# Patient Record
Sex: MALE | Race: WHITE | Employment: FULL TIME | URBAN - METROPOLITAN AREA
[De-identification: names, ages, dates, MRNs, and addresses within clinical notes are randomized per-mention and may not be internally consistent; named-entity substitution may affect disease eponyms.]

---

## 2018-07-10 ENCOUNTER — HOSPITAL ENCOUNTER (OUTPATIENT)
Age: 63
Discharge: HOME | End: 2018-07-10
Payer: MEDICAID

## 2018-07-10 ENCOUNTER — OFFICE VISIT (OUTPATIENT)
Dept: NEUROLOGY | Age: 63
End: 2018-07-10
Payer: MEDICAID

## 2018-07-10 VITALS
WEIGHT: 212.6 LBS | HEIGHT: 75 IN | DIASTOLIC BLOOD PRESSURE: 92 MMHG | BODY MASS INDEX: 26.43 KG/M2 | HEART RATE: 68 BPM | SYSTOLIC BLOOD PRESSURE: 135 MMHG
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
## 2018-07-10 DIAGNOSIS — G62.9 NEUROPATHY: Primary | ICD-10-CM

## 2018-07-10 DIAGNOSIS — R26.81 GAIT INSTABILITY: ICD-10-CM

## 2018-07-10 DIAGNOSIS — G62.9 NEUROPATHY: ICD-10-CM

## 2018-07-10 LAB
ESTIMATED AVERAGE GLUCOSE: 117 MG/DL
FOLATE: >20 NG/ML
HBA1C MFR BLD: 5.7 % (ref 4–6)
HOMOCYSTEINE: 12.7 UMOL/L
THYROXINE, FREE: 1.48 NG/DL (ref 0.93–1.7)
TSH SERPL DL<=0.05 MIU/L-ACNC: 5.47 MIU/L (ref 0.3–5)
VITAMIN B-12: 482 PG/ML (ref 232–1245)

## 2018-07-10 PROCEDURE — 82746 ASSAY OF FOLIC ACID SERUM: CPT

## 2018-07-10 PROCEDURE — 82525 ASSAY OF COPPER: CPT

## 2018-07-10 PROCEDURE — 84439 ASSAY OF FREE THYROXINE: CPT

## 2018-07-10 PROCEDURE — G8427 DOCREV CUR MEDS BY ELIG CLIN: HCPCS | Performed by: PSYCHIATRY & NEUROLOGY

## 2018-07-10 PROCEDURE — 82607 VITAMIN B-12: CPT

## 2018-07-10 PROCEDURE — 84156 ASSAY OF PROTEIN URINE: CPT

## 2018-07-10 PROCEDURE — 84207 ASSAY OF VITAMIN B-6: CPT

## 2018-07-10 PROCEDURE — 84446 ASSAY OF VITAMIN E: CPT

## 2018-07-10 PROCEDURE — 84155 ASSAY OF PROTEIN SERUM: CPT

## 2018-07-10 PROCEDURE — 83090 ASSAY OF HOMOCYSTEINE: CPT

## 2018-07-10 PROCEDURE — 99244 OFF/OP CNSLTJ NEW/EST MOD 40: CPT | Performed by: PSYCHIATRY & NEUROLOGY

## 2018-07-10 PROCEDURE — G8419 CALC BMI OUT NRM PARAM NOF/U: HCPCS | Performed by: PSYCHIATRY & NEUROLOGY

## 2018-07-10 PROCEDURE — 3017F COLORECTAL CA SCREEN DOC REV: CPT | Performed by: PSYCHIATRY & NEUROLOGY

## 2018-07-10 PROCEDURE — 36415 COLL VENOUS BLD VENIPUNCTURE: CPT

## 2018-07-10 PROCEDURE — 83921 ORGANIC ACID SINGLE QUANT: CPT

## 2018-07-10 PROCEDURE — 84443 ASSAY THYROID STIM HORMONE: CPT

## 2018-07-10 PROCEDURE — 84166 PROTEIN E-PHORESIS/URINE/CSF: CPT

## 2018-07-10 PROCEDURE — 83036 HEMOGLOBIN GLYCOSYLATED A1C: CPT

## 2018-07-10 PROCEDURE — 84165 PROTEIN E-PHORESIS SERUM: CPT

## 2018-07-10 PROCEDURE — 86235 NUCLEAR ANTIGEN ANTIBODY: CPT

## 2018-07-10 RX ORDER — CARVEDILOL 12.5 MG/1
12.5 TABLET ORAL 2 TIMES DAILY WITH MEALS
COMMUNITY

## 2018-07-10 RX ORDER — LACTULOSE 10 G/15ML
20 SOLUTION ORAL PRN
COMMUNITY
End: 2021-01-05

## 2018-07-10 RX ORDER — QUETIAPINE FUMARATE 50 MG/1
50 TABLET, FILM COATED ORAL 2 TIMES DAILY
COMMUNITY
End: 2019-03-19 | Stop reason: ALTCHOICE

## 2018-07-10 RX ORDER — BUPROPION HYDROCHLORIDE 100 MG/1
100 TABLET ORAL 2 TIMES DAILY
COMMUNITY
End: 2020-07-28 | Stop reason: ALTCHOICE

## 2018-07-10 RX ORDER — HYDROCODONE BITARTRATE AND ACETAMINOPHEN 5; 325 MG/1; MG/1
1 TABLET ORAL EVERY 6 HOURS PRN
COMMUNITY
End: 2020-07-28

## 2018-07-10 RX ORDER — ONDANSETRON 4 MG/1
4 TABLET, FILM COATED ORAL EVERY 8 HOURS PRN
COMMUNITY
End: 2020-07-28

## 2018-07-10 RX ORDER — ESCITALOPRAM OXALATE 20 MG/1
20 TABLET ORAL DAILY
COMMUNITY

## 2018-07-10 RX ORDER — ALLOPURINOL 100 MG/1
100 TABLET ORAL 2 TIMES DAILY
COMMUNITY

## 2018-07-10 RX ORDER — LISINOPRIL 10 MG/1
10 TABLET ORAL DAILY
COMMUNITY

## 2018-07-10 NOTE — PROGRESS NOTES
[facility]  [address]  [facility], [address] [facility]  Dept: [phone]  Dept Fax: [phone]       MD Angela Renteria MD Ahmed B. Lolita Clap, MD Paola Seaman, MD Colby Shell, MD Orpah Comp, NIRALI    New Patient Consultation    7/10/2018    History of Present Illness:  I had the pleasure of seeing your patient, Crystal Ortega who presents with frequent falls. Patient reports his symptoms started approximately a year ago. He feels out of balance, whenever he walks and even sometimes if he is just standing on solid ground. He has fallen in all directions: Forward, backward and sideways. He denies any associated dizziness or lightheadedness, denies any palpitations, blurring of vision, diplopia, headache or any focal neurologic deficits. He denies any weakness in his lower extremities or pain. He does report a 6 month history of numbness that started in his toes, and this gradually moved up to both ankles. Patient says he cannot feel his shoes, and does not feel his dog licking his feet. He denies any associated tingling or pain. He reports falling approximately 3 times a week and has scraped himself multiple times, denies any serious injury. He lives with his wife in a 1 level house, but has multiple steps inside and outside the house. He also reports a history of brachial plexopathy on the right from an accident back in 2006, and has some residual weakness with his right . He has a history of hepatitis C and prior encephalopathy from this, has no history of diabetes.     Prior workup: none      Past Medical History:   Diagnosis Date    Hepatitis C     Hypertension     Neuropathy ([facility] [address]        Past Surgical History:   Procedure Laterality Date    TONSILLECTOMY         Family History   Problem Relation Age of Onset    Migraines Mother     Alzheimer's Disease Father     Alzheimer's Disease Paternal Grandfather        Social History ANDREW, no nystagmus, no ptosis   V - normal facial sensation                                                               VII - normal facial symmetry                                                             VIII - intact hearing                                                                             IX, X - symmetrical palate                                                                  XI - symmetrical shoulder shrug                                                       XII - tongue midline without atrophy or fasciculation      Motor function  Normal muscle bulk and tone; strength 5/5 on all b/l upper extremities, no pronator drift. Strength 5/5 on b/l quads, hamstrings, knee flexion and extension, 4-/5 on foot dorsiflexion on L, 4+ on R. Foot eversion 4-/5 on L, 4+/5 on R, plantarflexion 5/5 on both feet       Sensory function Dense sensory loss to or for modalities on the bilateral feet up to the ankles      Cerebellar Intact fine motor movement. No involuntary movements or tremors. No ataxia or dysmetria on finger to nose or heel to shin testing      Reflex function DTR 2+ on bilateral UE and LE, symmetric. Negative Babinski      Gait                   normal base, + steppage gait          Assessment:  Bilateral foot drop  Frequent falls  Neuropathy    Plan: This is a 66 y.o. male who presents with a one-year history of falls in a 6 month history of numbness in both feet. Exam today is remarkable for weakness in plantarflexion and eversion on both feet, worse on the left. He also has dense sensory loss to all 4 modalities on both feet up to the ankles. Will start workup with an EMG of the bilateral lower extremities as well as lab work to look for secondary causes of neuropathy. Given his frequent falls and gait instability, we'll also start gait training through physical therapy. Follow-up in 8-10 weeks.             Signed: Cristian Antonio MD    Please note that this chart was generated using

## 2018-07-11 LAB
ALBUMIN (CALCULATED): 4.9 G/DL (ref 3.2–5.2)
ALBUMIN PERCENT: 65 % (ref 45–65)
ALPHA 1 PERCENT: 2 % (ref 3–6)
ALPHA 2 PERCENT: 10 % (ref 6–13)
ALPHA-1-GLOBULIN: 0.1 G/DL (ref 0.1–0.4)
ALPHA-2-GLOBULIN: 0.7 G/DL (ref 0.5–0.9)
ANTI SSA: 27 U/ML
ANTI SSB: 18 U/ML
BETA GLOBULIN: 0.7 G/DL (ref 0.5–1.1)
BETA PERCENT: 9 % (ref 11–19)
GAMMA GLOBULIN %: 15 % (ref 9–20)
GAMMA GLOBULIN: 1.1 G/DL (ref 0.5–1.5)
PATHOLOGIST: ABNORMAL
PROTEIN ELECTROPHORESIS, SERUM: ABNORMAL
TOTAL PROT. SUM,%: 101 % (ref 98–102)
TOTAL PROT. SUM: 7.5 G/DL (ref 6.3–8.2)
TOTAL PROTEIN: 7.5 G/DL (ref 6.4–8.3)

## 2018-07-12 LAB
P E INTERPRETATION, U: NORMAL
PATHOLOGIST: NORMAL
SPECIMEN TYPE: NORMAL
URINE TOTAL PROTEIN: 10 MG/DL

## 2018-07-13 LAB
COPPER: 132 UG/DL (ref 70–140)
METHYLMALONIC ACID: 0.21 UMOL/L (ref 0–0.4)

## 2018-07-14 LAB — VITAMIN B6: 83.8 NMOL/L (ref 20–125)

## 2018-07-15 LAB
ALPHA-TOCOPHEROL: 10.7 MG/L (ref 5.5–18)
GAMMA-TOCOPHEROL: 0.6 MG/L (ref 0–6)

## 2018-07-16 ENCOUNTER — TELEPHONE (OUTPATIENT)
Dept: NEUROLOGY | Age: 63
End: 2018-07-16

## 2018-07-25 ENCOUNTER — TELEPHONE (OUTPATIENT)
Dept: NEUROLOGY | Age: 63
End: 2018-07-25

## 2018-07-25 DIAGNOSIS — R26.81 GAIT INSTABILITY: Primary | ICD-10-CM
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
## 2018-07-25 NOTE — TELEPHONE ENCOUNTER
Pt called in stating he had gone to therapy and the therapist stated he needs a walker. He is asking for an order for a walker. Please advise, thanks.

## 2018-07-27 ENCOUNTER — TELEPHONE (OUTPATIENT)
Dept: NEUROLOGY | Age: 63
End: 2018-07-27

## 2018-08-21 ENCOUNTER — OFFICE VISIT (OUTPATIENT)
Dept: NEUROLOGY | Age: 63
End: 2018-08-21
Payer: MEDICAID

## 2018-08-21 VITALS — HEIGHT: 74 IN | BODY MASS INDEX: 27.21 KG/M2 | WEIGHT: 212 LBS

## 2018-08-21 DIAGNOSIS — R29.6 RECURRENT FALLS WHILE WALKING: ICD-10-CM

## 2018-08-21 DIAGNOSIS — R20.0 NUMBNESS IN BOTH LEGS: Primary | ICD-10-CM

## 2018-08-21 DIAGNOSIS — R26.81 GAIT INSTABILITY: ICD-10-CM

## 2018-08-21 PROCEDURE — 95886 MUSC TEST DONE W/N TEST COMP: CPT | Performed by: PSYCHIATRY & NEUROLOGY

## 2018-08-21 PROCEDURE — 95912 NRV CNDJ TEST 11-12 STUDIES: CPT | Performed by: PSYCHIATRY & NEUROLOGY

## 2018-09-18 ENCOUNTER — OFFICE VISIT (OUTPATIENT)
Dept: NEUROLOGY | Age: 63
End: 2018-09-18
Payer: MEDICAID

## 2018-09-18 VITALS
WEIGHT: 216.6 LBS | HEIGHT: 73 IN | SYSTOLIC BLOOD PRESSURE: 126 MMHG | DIASTOLIC BLOOD PRESSURE: 87 MMHG | BODY MASS INDEX: 28.71 KG/M2 | HEART RATE: 70 BPM

## 2018-09-18 DIAGNOSIS — R73.03 PREDIABETES: ICD-10-CM

## 2018-09-18 DIAGNOSIS — G60.8 PERIPHERAL SENSORY-MOTOR AXONAL POLYNEUROPATHY: Primary | ICD-10-CM

## 2018-09-18 DIAGNOSIS — E03.8 SUBCLINICAL HYPOTHYROIDISM: ICD-10-CM

## 2018-09-18 PROCEDURE — G8419 CALC BMI OUT NRM PARAM NOF/U: HCPCS | Performed by: PSYCHIATRY & NEUROLOGY

## 2018-09-18 PROCEDURE — 3017F COLORECTAL CA SCREEN DOC REV: CPT | Performed by: PSYCHIATRY & NEUROLOGY

## 2018-09-18 PROCEDURE — 1036F TOBACCO NON-USER: CPT | Performed by: PSYCHIATRY & NEUROLOGY

## 2018-09-18 PROCEDURE — 99214 OFFICE O/P EST MOD 30 MIN: CPT | Performed by: PSYCHIATRY & NEUROLOGY

## 2018-09-18 PROCEDURE — G8427 DOCREV CUR MEDS BY ELIG CLIN: HCPCS | Performed by: PSYCHIATRY & NEUROLOGY

## 2018-09-18 NOTE — PROGRESS NOTES
(SEROQUEL) 50 MG tablet Take 50 mg by mouth 2 times daily      buPROPion (WELLBUTRIN) 100 MG tablet Take 100 mg by mouth 2 times daily      carvedilol (COREG) 12.5 MG tablet Take 12.5 mg by mouth 2 times daily (with meals)      allopurinol (ZYLOPRIM) 100 MG tablet Take 100 mg by mouth daily      lisinopril (PRINIVIL;ZESTRIL) 10 MG tablet Take 10 mg by mouth daily      ondansetron (ZOFRAN) 4 MG tablet Take 4 mg by mouth every 8 hours as needed for Nausea or Vomiting      lactulose (CHRONULAC) 10 GM/15ML solution Take 20 g by mouth as needed      HYDROcodone-acetaminophen (NORCO) 5-325 MG per tablet Take 1 tablet by mouth every 6 hours as needed for Pain. .       No current facility-administered medications for this visit.                                          PHYSICAL EXAMINATION       Vitals:    09/18/18 0904   BP: 126/87   Pulse: 70                                              .                                                                                                    General Appearance:  Alert, cooperative, no signs of distress, appears stated age   Head:  Normocephalic, no signs of trauma   Eyes:  Conjunctiva/corneas clear;  eyelids intact   Ears:  Normal external ear and canals   Nose: Nares normal, mucosa normal, no drainage    Throat: Lips and tongue normal; teeth normal;  gums normal   Neck: Supple, intact flexion, extension and rotation;   trachea midline;  no adenopathy;   thyroid: not enlarged;   no carotid pulse abnormality   Back:   Symmetric, no curvature, ROM adequate   Lungs:   Respirations unlabored   Heart:  Regular rate and rhythm           Extremities: Extremities normal, no cyanosis, no edema   Pulses: Symmetric over head and neck   Skin: Skin color, texture normal, no rashes, no lesions                                             NEUROLOGIC EXAMINATION    Mental status    Alert and oriented x 3; intact memory with no confusion, speech or language problems; no hallucinations or delusions  Fund of information appropriate for level of education    Cranial nerves    II - visual fields intact to confrontation , fundoscopy showed no  papiledema                                                III, IV, VI - extra-ocular muscles full: no pupillary defect; no ANDREW, no nystagmus, no ptosis   V - normal facial sensation                                                               VII - normal facial symmetry                                                             VIII - intact hearing                                                                             IX, X - symmetrical palate                                                                  XI - symmetrical shoulder shrug                                                       XII - tongue midline without atrophy or fasciculation      Motor function  Normal muscle bulk and tone; strength 5/5 on all b/l upper extremities, no pronator drift. Strength 5/5 on b/l quads, hamstrings, knee flexion and extension, 4-/5 on foot dorsiflexion on L, 4+ on R. Foot eversion 4-/5 on L, 4+/5 on R, plantarflexion 5/5 on both feet       Sensory function Dense sensory loss to or for modalities on the bilateral feet up to the ankles      Cerebellar Intact fine motor movement. No involuntary movements or tremors. No ataxia or dysmetria on finger to nose or heel to shin testing      Reflex function DTR 2+ on bilateral UE and LE, symmetric. Negative Babinski      Gait                   normal base, + steppage gait              ASSESSMENT/PLAN:       This is a 66 y.o. male who comes in for follow up for A history of frequent falls and numbness in the bilateral feet. Workup showed severe axonal sensorimotor polyneuropathy. Lab work showed both prediabetes as well as subclinical hypothyroidism. I suspect it is mostly the prediabetes that is causing his neuropathy.  Discussed dietary changes in detail with the patient, particularly decreasing carbohydrate intake to

## 2019-03-19 ENCOUNTER — OFFICE VISIT (OUTPATIENT)
Dept: NEUROLOGY | Age: 64
End: 2019-03-19
Payer: MEDICARE

## 2019-03-19 VITALS
DIASTOLIC BLOOD PRESSURE: 86 MMHG | WEIGHT: 224 LBS | HEIGHT: 75 IN | BODY MASS INDEX: 27.85 KG/M2 | HEART RATE: 78 BPM | SYSTOLIC BLOOD PRESSURE: 124 MMHG

## 2019-03-19 DIAGNOSIS — G60.8 PERIPHERAL SENSORY-MOTOR AXONAL POLYNEUROPATHY: Primary | ICD-10-CM

## 2019-03-19 DIAGNOSIS — R29.6 RECURRENT FALLS WHILE WALKING: ICD-10-CM

## 2019-03-19 DIAGNOSIS — R26.81 GAIT INSTABILITY: ICD-10-CM

## 2019-03-19 PROCEDURE — G8427 DOCREV CUR MEDS BY ELIG CLIN: HCPCS | Performed by: PSYCHIATRY & NEUROLOGY

## 2019-03-19 PROCEDURE — G8484 FLU IMMUNIZE NO ADMIN: HCPCS | Performed by: PSYCHIATRY & NEUROLOGY

## 2019-03-19 PROCEDURE — 3017F COLORECTAL CA SCREEN DOC REV: CPT | Performed by: PSYCHIATRY & NEUROLOGY

## 2019-03-19 PROCEDURE — G8419 CALC BMI OUT NRM PARAM NOF/U: HCPCS | Performed by: PSYCHIATRY & NEUROLOGY

## 2019-03-19 PROCEDURE — 1036F TOBACCO NON-USER: CPT | Performed by: PSYCHIATRY & NEUROLOGY

## 2019-03-19 PROCEDURE — 99214 OFFICE O/P EST MOD 30 MIN: CPT | Performed by: PSYCHIATRY & NEUROLOGY

## 2019-03-19 RX ORDER — RISPERIDONE 0.5 MG/1
0.5 TABLET, FILM COATED ORAL 2 TIMES DAILY
COMMUNITY
End: 2021-01-05

## 2019-03-21 DIAGNOSIS — G60.8 PERIPHERAL SENSORY-MOTOR AXONAL POLYNEUROPATHY: ICD-10-CM

## 2019-07-09 ENCOUNTER — HOSPITAL ENCOUNTER (OUTPATIENT)
Age: 64
Setting detail: SPECIMEN
Discharge: HOME | End: 2019-07-09
Payer: MEDICARE

## 2019-07-09 ENCOUNTER — OFFICE VISIT (OUTPATIENT)
Dept: NEUROLOGY | Age: 64
End: 2019-07-09
Payer: MEDICARE

## 2019-07-09 VITALS
HEART RATE: 93 BPM | BODY MASS INDEX: 29.02 KG/M2 | HEIGHT: 75 IN | WEIGHT: 233.4 LBS | SYSTOLIC BLOOD PRESSURE: 136 MMHG | DIASTOLIC BLOOD PRESSURE: 94 MMHG

## 2019-07-09 DIAGNOSIS — R73.03 PREDIABETES: ICD-10-CM

## 2019-07-09 DIAGNOSIS — R26.81 GAIT INSTABILITY: ICD-10-CM

## 2019-07-09 DIAGNOSIS — G60.8 PERIPHERAL SENSORY-MOTOR AXONAL POLYNEUROPATHY: Primary | ICD-10-CM

## 2019-07-09 PROCEDURE — G8419 CALC BMI OUT NRM PARAM NOF/U: HCPCS | Performed by: PSYCHIATRY & NEUROLOGY

## 2019-07-09 PROCEDURE — 99214 OFFICE O/P EST MOD 30 MIN: CPT | Performed by: PSYCHIATRY & NEUROLOGY

## 2019-07-09 PROCEDURE — 1036F TOBACCO NON-USER: CPT | Performed by: PSYCHIATRY & NEUROLOGY

## 2019-07-09 PROCEDURE — G8427 DOCREV CUR MEDS BY ELIG CLIN: HCPCS | Performed by: PSYCHIATRY & NEUROLOGY

## 2019-07-09 PROCEDURE — 3017F COLORECTAL CA SCREEN DOC REV: CPT | Performed by: PSYCHIATRY & NEUROLOGY

## 2019-07-09 RX ORDER — TRAZODONE HYDROCHLORIDE 150 MG/1
150 TABLET ORAL NIGHTLY
COMMUNITY

## 2019-07-09 RX ORDER — ARIPIPRAZOLE 5 MG/1
5 TABLET ORAL DAILY
COMMUNITY

## 2019-07-09 SDOH — HEALTH STABILITY: MENTAL HEALTH: HOW OFTEN DO YOU HAVE A DRINK CONTAINING ALCOHOL?: NOT ASKED

## 2019-07-09 NOTE — PROGRESS NOTES
symmetry                                                             VIII - intact hearing                                                                             IX, X - symmetrical palate                                                                  XI - symmetrical shoulder shrug                                                       XII - tongue midline without atrophy or fasciculation      Motor function  Normal muscle bulk and tone; strength 5/5 on all b/l upper extremities, no pronator drift. Strength 5/5 on b/l quads, hamstrings, knee flexion and extension, 4-/5 on foot dorsiflexion on L, 4+ on R. Foot eversion 4-/5 on L, 4+/5 on R, plantarflexion 5/5 on both feet       Sensory function Dense sensory loss to all 4 modalities on the bilateral feet up to the ankles      Cerebellar Intact fine motor movement. No involuntary movements or tremors. No ataxia or dysmetria on finger to nose or heel to shin testing      Reflex function DTR 2+ on bilateral UE and LE, symmetric. Negative Babinski      Gait                   normal base, + foot eversion on R          ASSESSMENT AND PLAN:       This is a 60 y.o. male who comes in for follow up for severe axonal sensorimotor polyneuropathy causing frequent falls and numbness in both feet. Repeat TSH normal, A1c increasing to 6.0. Patient reports he has decreased sugar intake since then, will recheck A1c today. We will also reorder physical therapy at [facility] to help with his balance problems since he continues to have regular falls. Follow-up in 3 to 6 months. Yemi Warner MD  Neurology and Sleep Medicine  [facility] 22.    Please note that this chart was generated using voice recognition Dragon dictation software. Although every effort was made to ensure the accuracy of this automated transcription, some errors in transcription may have occurred.

## 2019-07-10 LAB
ESTIMATED AVERAGE GLUCOSE: 140 MG/DL
HBA1C MFR BLD: 6.5 % (ref 4–6)

## 2020-01-09 ENCOUNTER — HOSPITAL ENCOUNTER (OUTPATIENT)
Age: 65
Setting detail: SPECIMEN
Discharge: HOME | End: 2020-01-09
Payer: MEDICARE

## 2020-01-09 ENCOUNTER — OFFICE VISIT (OUTPATIENT)
Dept: NEUROLOGY | Age: 65
End: 2020-01-09
Payer: MEDICARE

## 2020-01-09 VITALS
HEIGHT: 76 IN | SYSTOLIC BLOOD PRESSURE: 109 MMHG | HEART RATE: 64 BPM | DIASTOLIC BLOOD PRESSURE: 75 MMHG | WEIGHT: 229.8 LBS | BODY MASS INDEX: 27.98 KG/M2

## 2020-01-09 LAB
ESTIMATED AVERAGE GLUCOSE: 143 MG/DL
HBA1C MFR BLD: 6.6 % (ref 4–6)

## 2020-01-09 PROCEDURE — G8427 DOCREV CUR MEDS BY ELIG CLIN: HCPCS | Performed by: PSYCHIATRY & NEUROLOGY

## 2020-01-09 PROCEDURE — 1036F TOBACCO NON-USER: CPT | Performed by: PSYCHIATRY & NEUROLOGY

## 2020-01-09 PROCEDURE — 99214 OFFICE O/P EST MOD 30 MIN: CPT | Performed by: PSYCHIATRY & NEUROLOGY

## 2020-01-09 PROCEDURE — G8419 CALC BMI OUT NRM PARAM NOF/U: HCPCS | Performed by: PSYCHIATRY & NEUROLOGY

## 2020-01-09 PROCEDURE — G8484 FLU IMMUNIZE NO ADMIN: HCPCS | Performed by: PSYCHIATRY & NEUROLOGY

## 2020-01-09 PROCEDURE — 3017F COLORECTAL CA SCREEN DOC REV: CPT | Performed by: PSYCHIATRY & NEUROLOGY

## 2020-01-09 RX ORDER — CHLORAL HYDRATE 500 MG
1 CAPSULE ORAL DAILY PRN
COMMUNITY

## 2020-01-09 NOTE — PROGRESS NOTES
[facility]  Offices: [address], [place], [address] [facility]  [address] [facility], [address], [address], [address] [place], [address]  Phone: [phone]  Fax: [phone]    MD Tamara Shetty Sa, MD Ashley Ramos, MD Darby Barksdale, MD [place], CNP    1/9/2020      HISTORY OF PRESENT ILLNESS:       I had the pleasure of seeing Shayy Ko, who returns for continuing neurologic care for numbness affecting both feet (onset 1/2018) and frequent falls (onset July 2017) secondary to severe axonal sensorimotor peripheral polyneuropathy. On his last visit, we started physical therapy at [facility] and also recheck his A1c. This is unfortunately increased to 6.5. Patient reports he was told by his general practitioner that he does not need medications yet, until it is rechecked. However, patient reports he has not had any blood draws since the summer. Clinically, he does report improvement of his balance with physical therapy. He denies any recent falls or near falls. His numbness also continues to be improved. It initially involved both his feet up to the midcalf, and is now limited to the midfoot down. The numbness comes and goes with no clear triggers, no alleviating or aggravating factors. He denies any significant associated pain, no burning or tingling. He denies any changes in his bowel or bladder habits. Of note, patient reports he does not eat a lot of sweets but the lactulose that he drinks for his history of hepatic encephalopathy is very sweet. He reportedly sees a GI nurse practitioner, does not know if his ammonia has been checked recently. He denies any other new complaints, no new medications.     Prior testing reviewed:  EMG 8/21/18: Above mentioned findings are indicating a relatively active and progressive process with combination of denervation and re-innervated motor unit action potentials (MUAPs) with decreased recruitment seen predominantly in distal muscle groups of both lower extremities suggesting axonal sensorimotor peripheral polyneuropathy of severe nature.  Clinical correlation is recommended. This study also demonstrated findings raising concern for bilateral lower lumbosacral radiculopathy of chronic nature.  Obtaining neuroimaging studies of spine would be helpful.  Clinical correlation is recommended.     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[address]     Depression     Fall down stairs 2019    Gout     Hepatic encephalopathy (HCC)     Hepatitis C     Hypertension     Neuropathy         PAST SURGICAL HISTORY:         Procedure Laterality Date    COLONOSCOPY  2013    ENDOSCOPIC ULTRASOUND (UPPER)  2019    TONSILLECTOMY          SOCIAL HISTORY:     Social History     Socioeconomic History    Marital status: Single     Spouse name: Not on file    Number of children: Not on file    Years of education: Not on file    Highest education level: Not on file   Occupational History    Not on file   Social Needs    Financial resource strain: Not on file    Food insecurity:     Worry: Not on file     Inability: Not on file    Transportation needs:     Medical: Not on file     Non-medical: Not on file   Tobacco Use    Smoking status: Former Smoker     Packs/day: 0.25     Years: 5.00     Pack years: 1.25     Last attempt to quit: 1975     Years since quittin.5    Smokeless tobacco: Never Used   Substance and Sexual Activity    Alcohol use: Not Currently    Drug use: No    Sexual activity: Not on file   Lifestyle    Physical activity:     Days per week: Not on file     Minutes per session: Not on file    Stress: Not on file Relationships    Social connections:     Talks on phone: Not on file     Gets together: Not on file     Attends Mosque service: Not on file     Active member of club or organization: Not on file     Attends meetings of clubs or organizations: Not on file     Relationship status: Not on file    Intimate partner violence:     Fear of current or ex partner: Not on file     Emotionally abused: Not on file     Physically abused: Not on file     Forced sexual activity: Not on file   Other Topics Concern    Not on file   Social History Narrative    Not on file       CURRENT MEDICATIONS:     Current Outpatient Medications   Medication Sig Dispense Refill    Multiple Vitamins-Minerals (MULTIVITAL-M PO) Take 1 tablet by mouth daily as needed      Omega-3 1000 MG CAPS Take 1 capsule by mouth daily as needed      traZODone (DESYREL) 100 MG tablet Take 100 mg by mouth nightly      ARIPiprazole (ABILIFY) 5 MG tablet Take 5 mg by mouth daily      risperiDONE (RISPERDAL) 0.5 MG tablet Take 0.5 mg by mouth 2 times daily      escitalopram (LEXAPRO) 20 MG tablet Take 20 mg by mouth daily      buPROPion (WELLBUTRIN) 100 MG tablet Take 100 mg by mouth 2 times daily      carvedilol (COREG) 12.5 MG tablet Take 12.5 mg by mouth 2 times daily (with meals)      allopurinol (ZYLOPRIM) 100 MG tablet Take 100 mg by mouth 2 times daily       lisinopril (PRINIVIL;ZESTRIL) 10 MG tablet Take 10 mg by mouth daily      ondansetron (ZOFRAN) 4 MG tablet Take 4 mg by mouth every 8 hours as needed for Nausea or Vomiting      lactulose (CHRONULAC) 10 GM/15ML solution Take 20 g by mouth as needed      HYDROcodone-acetaminophen (NORCO) 5-325 MG per tablet Take 1 tablet by mouth every 6 hours as needed for Pain. .       No current facility-administered medications for this visit.          ALLERGIES:     Allergies   Allergen Reactions    Haloperidol And Related                              REVIEW OF SYSTEMS    CONSTITUTIONAL Weight: head and neck   Skin: Skin color, texture normal, no rashes, no lesions                                     NEUROLOGIC EXAMINATION    Alert and oriented x 3; intact memory with no confusion, speech or language problems; no hallucinations or delusions  Fund of information appropriate for level of education    Cranial nerves    II - visual fields intact to confrontation , fundoscopy showed no  papiledema                                                III, IV, VI - extra-ocular muscles full: no pupillary defect; no ANDREW, no nystagmus, no ptosis   V - normal facial sensation                                                               VII - normal facial symmetry                                                             VIII - intact hearing                                                                             IX, X - symmetrical palate                                                                  XI - symmetrical shoulder shrug                                                       XII - tongue midline without atrophy or fasciculation      Motor function  Normal muscle bulk and tone; strength 5/5 on b/l upper extremities, no pronator drift. Strength 5/5 on b/l quads, hamstrings, knee flexion and extension, 4-/5 on foot dorsiflexion on L, 4+ on R. Foot eversion 4-/5 on L, 4+/5 on R, plantarflexion 5/5 on both feet       Sensory function Dense sensory loss to all 4 modalities on the bilateral feet up to the ankles      Cerebellar Intact fine motor movement. No involuntary movements or tremors. No ataxia or dysmetria on finger to nose or heel to shin testing      Reflex function DTR 1+ on bilateral UE and LE, symmetric. Negative Babinski      Gait                   normal base, + foot eversion on R                 ASSESSMENT AND PLAN:       This is a 60 y.o. male who comes in for follow up for severe axonal sensorimotor polyneuropathy causing frequent falls and numbness in both feet.   His A1c has gradually been increasing and is now in the diabetic range at 6.5. He is not on any treatment. Will recheck his A1c again today, as well as his ammonia since he drinks lactulose on a regular basis for reported hepatic encephalopathy. If his A1c is again elevated, he will need to be started on treatment. Will call patient with results, follow-up in clinic in 6 months. Fredi Gimenez MD  Neurology and Sleep Medicine  [facility] 22.    Please note that this chart was generated using voice recognition Dragon dictation software. Although every effort was made to ensure the accuracy of this automated transcription, some errors in transcription may have occurred.

## 2020-07-28 ENCOUNTER — OFFICE VISIT (OUTPATIENT)
Dept: NEUROLOGY | Age: 65
End: 2020-07-28
Payer: MEDICARE

## 2020-07-28 ENCOUNTER — HOSPITAL ENCOUNTER (OUTPATIENT)
Age: 65
Discharge: HOME | End: 2020-07-28
Payer: MEDICARE

## 2020-07-28 VITALS
SYSTOLIC BLOOD PRESSURE: 90 MMHG | WEIGHT: 227.2 LBS | HEART RATE: 85 BPM | BODY MASS INDEX: 28.25 KG/M2 | DIASTOLIC BLOOD PRESSURE: 63 MMHG | TEMPERATURE: 97.3 F | HEIGHT: 75 IN

## 2020-07-28 LAB
ESTIMATED AVERAGE GLUCOSE: 137 MG/DL
HBA1C MFR BLD: 6.4 % (ref 4–6)
TSH SERPL DL<=0.05 MIU/L-ACNC: 3.34 MIU/L (ref 0.3–5)

## 2020-07-28 PROCEDURE — 84443 ASSAY THYROID STIM HORMONE: CPT

## 2020-07-28 PROCEDURE — 36415 COLL VENOUS BLD VENIPUNCTURE: CPT

## 2020-07-28 PROCEDURE — 1036F TOBACCO NON-USER: CPT | Performed by: PSYCHIATRY & NEUROLOGY

## 2020-07-28 PROCEDURE — 83036 HEMOGLOBIN GLYCOSYLATED A1C: CPT

## 2020-07-28 PROCEDURE — G8419 CALC BMI OUT NRM PARAM NOF/U: HCPCS | Performed by: PSYCHIATRY & NEUROLOGY

## 2020-07-28 PROCEDURE — G8427 DOCREV CUR MEDS BY ELIG CLIN: HCPCS | Performed by: PSYCHIATRY & NEUROLOGY

## 2020-07-28 PROCEDURE — 3017F COLORECTAL CA SCREEN DOC REV: CPT | Performed by: PSYCHIATRY & NEUROLOGY

## 2020-07-28 PROCEDURE — 99214 OFFICE O/P EST MOD 30 MIN: CPT | Performed by: PSYCHIATRY & NEUROLOGY

## 2020-07-28 RX ORDER — METFORMIN HYDROCHLORIDE 500 MG/1
1 TABLET, EXTENDED RELEASE ORAL 2 TIMES DAILY
COMMUNITY
Start: 2020-07-06

## 2020-07-28 RX ORDER — RIFAXIMIN 550 MG/1
1 TABLET ORAL 2 TIMES DAILY
COMMUNITY
Start: 2020-07-15

## 2020-07-28 RX ORDER — FENOFIBRATE 145 MG/1
1 TABLET, COATED ORAL DAILY
COMMUNITY
Start: 2020-07-06

## 2020-07-28 NOTE — PROGRESS NOTES
[facility]  Offices: [address], [place], [address] [facility]  [address] [facility], [address], [place], 183 [facility]  [address] [place] [address] Saji Thomas, [address]  Phone: [phone]  Fax: [phone]    MD Star Nolan, MD Macario Borges MD Delories Elk, MD Youlanda Francois, CNP    7/28/2020      HISTORY OF PRESENT ILLNESS:       I had the pleasure of seeing Masha Acevedo, who returns for continuing neurologic care for numbness affecting both feet (onset 1/2018) and frequent falls (onset July 2017) secondary to severe axonal sensorimotor peripheral polyneuropathy. On his last visit, I rechecked his A1c and ammonia given his history of hepatic encephalopathy. His ammonia came back normal but his A1c has increased to 6.5. Patient reports he is now on metformin  mg daily that was started approximately 3 months ago. He has also stopped taking lactulose since his ammonia has been within normal limits. Thankfully, patient denies any recent falls and no significant change with his bilateral lower extremity numbness. He continues to deny any burning, tingling or discomfort. His numbness is now constant, with no clear aggravating or alleviating factors. He denies any changes in bowel or bladder habits, no new symptoms. Of note, review of his prior labs also showed an elevated TSH back in 2018. Patient denies any history of thyroid issues. Prior testing reviewed:  EMG 8/21/18: Above mentioned findings are indicating a relatively active and progressive process with combination of denervation and re-innervated motor unit action potentials (MUAPs) with decreased recruitment seen predominantly in distal muscle groups of both lower extremities suggesting axonal sensorimotor peripheral polyneuropathy of severe nature.  Clinical correlation is recommended.   This study also demonstrated findings raising concern for bilateral service: Not on file     Active member of club or organization: Not on file     Attends meetings of clubs or organizations: Not on file     Relationship status: Not on file    Intimate partner violence     Fear of current or ex partner: Not on file     Emotionally abused: Not on file     Physically abused: Not on file     Forced sexual activity: Not on file   Other Topics Concern    Not on file   Social History Narrative    Not on file       CURRENT MEDICATIONS:     Current Outpatient Medications   Medication Sig Dispense Refill    fenofibrate (TRICOR) 145 MG tablet Take 1 tablet by mouth daily      XIFAXAN 550 MG tablet Take 1 tablet by mouth 2 times daily      metFORMIN (GLUCOPHAGE-XR) 500 MG extended release tablet Take 1 tablet by mouth daily      Multiple Vitamins-Minerals (MULTIVITAL-M PO) Take 1 tablet by mouth daily as needed      Omega-3 1000 MG CAPS Take 1 capsule by mouth daily as needed      traZODone (DESYREL) 100 MG tablet Take 100 mg by mouth nightly      ARIPiprazole (ABILIFY) 5 MG tablet Take 5 mg by mouth daily      risperiDONE (RISPERDAL) 0.5 MG tablet Take 0.5 mg by mouth 2 times daily      escitalopram (LEXAPRO) 20 MG tablet Take 20 mg by mouth daily      carvedilol (COREG) 12.5 MG tablet Take 12.5 mg by mouth 2 times daily (with meals)      allopurinol (ZYLOPRIM) 100 MG tablet Take 100 mg by mouth 2 times daily       lisinopril (PRINIVIL;ZESTRIL) 10 MG tablet Take 10 mg by mouth daily      lactulose (CHRONULAC) 10 GM/15ML solution Take 20 g by mouth as needed       No current facility-administered medications for this visit.          ALLERGIES:     Allergies   Allergen Reactions    Haloperidol And Related                              REVIEW OF SYSTEMS    CONSTITUTIONAL Weight: present, Appetite: absent, Fatigue: present      HEENT Ears: ringing, Visual disturbance: absent   RESPIRATORY Shortness of breath: absent, Cough: absent   CARDIOVASCULAR Chest pain: absent, Leg swelling :absent      GI Constipation: absent, Diarrhea: absent, Swallowing change: absent       Urinary frequency: absent, Urinary urgency: absent, Urinary incontinence: absent   MUSCULOSKELETAL Neck pain: absent, Back pain: absent, Stiffness: absent, Muscle pain: absent, Joint pain: absent Restless legs: absent   DERMATOLOGIC Hair loss: absent, Skin changes: absent   NEUROLOGIC Memory loss: absent, Confusion: absent, Seizures: absent Trouble walking or imbalance: absent, Dizziness: absent, Weakness: absent, Numbness: present Tremor: absent, Spasm: absent, Speech difficulty: absent, Headache: absent, Light sensitivity: absent   PSYCHIATRIC Anxiety: absent, Hallucination: absent, Mood disorder: absent   HEMATOLOGIC Abnormal bleeding: absent, Anemia: absent, Clotting disorder: absent, Lymph gland changes: absent           PHYSICAL EXAMINATION:       Vitals:    07/28/20 1053   BP: 90/63   Pulse: 85   Temp: 97.3 °F (36.3 °C)                                              .                                                                                                     General Appearance:  Alert, cooperative, no signs of distress, appears stated age   Head:  Normocephalic, no signs of trauma   Eyes:  Conjunctiva/corneas clear;  eyelids intact   Ears:  Normal external ear and canals   Nose: Nares normal, mucosa normal, no drainage    Throat: Lips and tongue normal; teeth normal;  gums normal   Neck: Supple, intact flexion, extension and rotation;   trachea midline;  no adenopathy;   thyroid: not enlarged;   no carotid pulse abnormality   Back:   Symmetric, no curvature, ROM adequate   Lungs:   Respirations unlabored   Heart:  Regular rate and rhythm           Extremities: Extremities normal, no cyanosis, no edema   Pulses: Symmetric over head and neck   Skin: Skin color, texture normal, no rashes, no lesions                                     NEUROLOGIC EXAMINATION      Mental status    Alert and oriented x 3; intact memory with no confusion, speech or language problems; no hallucinations or delusions  Fund of information appropriate for level of education    Cranial nerves    II - visual fields intact to confrontation , fundoscopy showed no  papiledema                                                III, IV, VI - extra-ocular muscles full: no pupillary defect; no ANDREW, no nystagmus, no ptosis   V - normal facial sensation                                                               VII - normal facial symmetry                                                             VIII - intact hearing                                                                             IX, X - symmetrical palate                                                                  XI - symmetrical shoulder shrug                                                       XII - tongue midline without atrophy or fasciculation      Motor function  Normal muscle bulk and tone; strength 5/5 on all 4 extremities, no pronator drift  + Pill-rolling, low amplitude, high-frequency postural and resting tremor on the right hand  + Bradykinesia on the right upper extremity  + Atrophy of the intrinsic hand muscles in the right hand (has a history of brachial plexopathy secondary to trauma)    Sensory function  decreased to light touch and pinprick in the bilateral lower extremities from the ankles down      Cerebellar Intact fine motor movement. No ataxia or dysmetria on finger to nose or heel to shin testing      Reflex function DTR 1+ on bilateral UE and 0 in b/l LE, symmetric. Negative Babinski      Gait                   normal base, + foot eversion on R              ASSESSMENT AND PLAN:       This is a 63 y.o. male who comes in for follow up for severe axonal sensorimotor polyneuropathy, causing bilateral lower extremity numbness and balance problems. His A1c is now in the diabetic range at 6.6, on metformin  mg daily.   Review of his labs also show elevated TSH in 2018, patient denies any history of thyroid problems. 1.  We will recheck A1c and TSH today  2. Thankfully, patient continues to have no significant pain from his neuropathy. Balance is also stable with no recent falls, no need for pharmacologic therapy at this point  3. 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 Follow-up in 6 months      Amy Bobo MD  Neurology and Sleep Medicine  [facility] 22.    Please note that this chart was generated using voice recognition Dragon dictation software. Although every effort was made to ensure the accuracy of this automated transcription, some errors in transcription may have occurred.

## 2020-08-04 ENCOUNTER — TELEPHONE (OUTPATIENT)
Dept: NEUROLOGY | Age: 65
End: 2020-08-04

## 2021-01-05 ENCOUNTER — OFFICE VISIT (OUTPATIENT)
Dept: NEUROLOGY | Age: 66
End: 2021-01-05
Payer: MEDICARE

## 2021-01-05 VITALS
HEART RATE: 81 BPM | BODY MASS INDEX: 29.67 KG/M2 | HEIGHT: 75 IN | DIASTOLIC BLOOD PRESSURE: 67 MMHG | SYSTOLIC BLOOD PRESSURE: 104 MMHG | TEMPERATURE: 97.9 F | WEIGHT: 238.6 LBS

## 2021-01-05 DIAGNOSIS — R73.03 PREDIABETES: ICD-10-CM

## 2021-01-05 DIAGNOSIS — G21.19 DRUG-INDUCED PARKINSONISM (HCC): ICD-10-CM

## 2021-01-05 DIAGNOSIS — G60.8 PERIPHERAL SENSORY-MOTOR AXONAL POLYNEUROPATHY: Primary | ICD-10-CM

## 2021-01-05 PROCEDURE — G8427 DOCREV CUR MEDS BY ELIG CLIN: HCPCS | Performed by: PSYCHIATRY & NEUROLOGY

## 2021-01-05 PROCEDURE — G8484 FLU IMMUNIZE NO ADMIN: HCPCS | Performed by: PSYCHIATRY & NEUROLOGY

## 2021-01-05 PROCEDURE — G8419 CALC BMI OUT NRM PARAM NOF/U: HCPCS | Performed by: PSYCHIATRY & NEUROLOGY

## 2021-01-05 PROCEDURE — 1036F TOBACCO NON-USER: CPT | Performed by: PSYCHIATRY & NEUROLOGY

## 2021-01-05 PROCEDURE — 99214 OFFICE O/P EST MOD 30 MIN: CPT | Performed by: PSYCHIATRY & NEUROLOGY

## 2021-01-05 PROCEDURE — 3017F COLORECTAL CA SCREEN DOC REV: CPT | Performed by: PSYCHIATRY & NEUROLOGY

## 2021-01-05 RX ORDER — LORATADINE 10 MG/1
10 TABLET ORAL DAILY
COMMUNITY

## 2021-01-05 NOTE — PROGRESS NOTES
[facility]  Offices: [address], [address], [address] [facility]  [address] [place] [address], [address], [place], [address] [place] [address]  [address] [place] [address] [address], [address]  Phone: [phone]  Fax: [phone]    MD Elena Angelo MD Ahmed B. Sheena Mowers, MD Debarah Malone, MD Jeraldene Reichert, MD Judythe Saliva, CNP    1/5/2021      HISTORY OF PRESENT ILLNESS:       I had the pleasure of seeing Eboni Fitzgerald, who returns for continuing neurologic care for numbness affecting both feet (onset 1/2018) and frequent falls (onset July 2017) secondary to severe axonal sensorimotor peripheral polyneuropathy. 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 Today, patient reports he is doing well. He continues to have significant pain on his legs or feet and also continues to deny any balance problems, he denies any recent falls or near falls. However, he reports his A1c was recently checked and it has increased to 7. His metformin was increased from once to twice daily. I also checked his TSH on his last visit and it was normal.    At baseline, he has constant numbness in both his feet with no aggravating or alleviating factors. He denies any change in bowel or bladder habits. With regards to his tremor, patient reports it is mild and actually a little bit better since he was recently taken off Risperdal.  He continues to take Abilify 5 mg daily, denies any other recent changes to his medications. He denies any other symptoms.       Prior testing reviewed:  EMG 8/21/18: Above mentioned findings are indicating a relatively active and progressive process with combination of denervation and re-innervated motor unit action potentials (MUAPs) with decreased recruitment seen predominantly in distal muscle groups of both lower extremities suggesting axonal sensorimotor peripheral polyneuropathy of severe nature. Deboraha Lennox correlation is recommended. This study also demonstrated findings raising concern for bilateral lower lumbosacral radiculopathy of chronic nature.  Obtaining neuroimaging studies of spine would be helpful.  Clinical correlation is recommended.   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[facility] [address]     Depression     Fall down stairs 2019    Gout     Hepatic encephalopathy (HCC)     Hepatitis C     Hypertension     Neuropathy         PAST SURGICAL HISTORY:         Procedure Laterality Date    COLONOSCOPY  2013    ENDOSCOPIC ULTRASOUND (UPPER)  2019    TONSILLECTOMY          SOCIAL HISTORY:     Social History     Socioeconomic History    Marital status: Single     Spouse name: Not on file    Number of children: Not on file    Years of education: Not on file    Highest education level: Not on file   Occupational History    Not on file   Social Needs    Financial resource strain: Not on file    Food insecurity     Worry: Not on file     Inability: Not on file    Transportation needs     Medical: Not on file     Non-medical: Not on file   Tobacco Use    Smoking status: Former Smoker     Packs/day: 0.25     Years: 5.00     Pack years: 1.25     Quit date: 1975     Years since quittin.5    Smokeless tobacco: Never Used   Substance and Sexual Activity    Alcohol use: Not Currently    Drug use: No    Sexual activity: Not on file   Lifestyle    Physical activity     Days per week: Not on file     Minutes per session: Not on file    Stress: Not on file   Relationships    Social connections     Talks on phone: Not on file     Gets together: Not on file     Attends Hinduism service: Not on file Active member of club or organization: Not on file     Attends meetings of clubs or organizations: Not on file     Relationship status: Not on file    Intimate partner violence     Fear of current or ex partner: Not on file     Emotionally abused: Not on file     Physically abused: Not on file     Forced sexual activity: Not on file   Other Topics Concern    Not on file   Social History Narrative    Not on file       FAMILY MEDICAL HISTORY:     Family History   Problem Relation Age of Onset    Migraines Mother     Alzheimer's Disease Father     Alzheimer's Disease Paternal Grandfather         CURRENT MEDICATIONS:     Current Outpatient Medications   Medication Sig Dispense Refill    loratadine (CLARITIN) 10 MG tablet Take 10 mg by mouth daily      fenofibrate (TRICOR) 145 MG tablet Take 1 tablet by mouth daily      XIFAXAN 550 MG tablet Take 1 tablet by mouth 2 times daily      metFORMIN (GLUCOPHAGE-XR) 500 MG extended release tablet Take 1 tablet by mouth 2 times daily       Multiple Vitamins-Minerals (MULTIVITAL-M PO) Take 1 tablet by mouth daily as needed      Omega-3 1000 MG CAPS Take 1 capsule by mouth daily as needed      traZODone (DESYREL) 150 MG tablet Take 150 mg by mouth nightly       ARIPiprazole (ABILIFY) 5 MG tablet Take 5 mg by mouth daily      escitalopram (LEXAPRO) 20 MG tablet Take 20 mg by mouth daily      carvedilol (COREG) 12.5 MG tablet Take 12.5 mg by mouth 2 times daily (with meals)      allopurinol (ZYLOPRIM) 100 MG tablet Take 100 mg by mouth 2 times daily       lisinopril (PRINIVIL;ZESTRIL) 10 MG tablet Take 10 mg by mouth daily       No current facility-administered medications for this visit. ALLERGIES:     Allergies   Allergen Reactions    Haloperidol And Related                              REVIEW OF SYSTEMS     All items selected indicate a positive finding. Those items not selected are negative.   Constitutional [] Weight loss/gain   [x] Fatigue  [] Fever/Chills   HEENT [] Hearing Loss  [] Visual Disturbance  [] Tinnitus  [] Eye pain   Respiratory [] Shortness of Breath  [] Cough  [] Snoring   Cardiovascular [] Chest Pain  [] Palpitations  [] Lightheaded   GI [x] Constipation  [] Diarrhea  [] Swallowing change    [] Urinary Frequency  [] Urinary Urgency   Musculoskeletal [] Neck pain  [] Back pain  [] Muscle pain  [] Restless legs   Dermatologic [] Skin changes   Neurologic [] Memory loss/confusion  [] Seizures  [] Trouble walking or imbalance  [] Dizziness  [] Weakness  [x] Numbness  [x] Tremors  [] Speech Difficulty  [] Headaches  [] Light Sensitivity  [] Sound Sensitivity   Endocrinology []Excessive thirst  []Excessive hunger   Psychiatric [] Anxiety/Depression  [] Hallucination   Allergy/immunology []Hives/environmental allergies   Hematologic/lymph [] Abnormal bleeding  [] Abnormal bruising         PHYSICAL EXAMINATION:       Vitals:    01/05/21 1103   BP: 104/67   Pulse: 81   Temp: 97.9 °F (36.6 °C)                                              .                                                                                                     General Appearance:  Alert, cooperative, no signs of distress, appears stated age   Head:  Normocephalic, no signs of trauma   Eyes:  Conjunctiva/corneas clear;  eyelids intact   Ears:  Normal external ear and canals   Nose: Nares normal, mucosa normal, no drainage    Throat: Lips and tongue normal; teeth normal;  gums normal   Neck: Supple, intact flexion, extension and rotation;   trachea midline;  no adenopathy;   thyroid: not enlarged;   no carotid pulse abnormality   Back:   Symmetric, no curvature, ROM adequate   Lungs:   Respirations unlabored   Heart:  Regular rate and rhythm           Extremities: Extremities normal, no cyanosis, no edema   Pulses: Symmetric over head and neck   Skin: Skin color, texture normal, no rashes, no lesions                                     NEUROLOGIC EXAMINATION      Mental status    Alert and oriented x 3; intact memory with no confusion, speech or language problems; no hallucinations or delusions  Fund of information appropriate for level of education    Cranial nerves    II - visual fields intact to confrontation , fundoscopy showed no  papiledema                                                III, IV, VI - extra-ocular muscles full: no pupillary defect; no ANDREW, no nystagmus, no ptosis   V - normal facial sensation                                                               VII - normal facial symmetry                                                             VIII - intact hearing                                                                             IX, X - symmetrical palate                                                                  XI - symmetrical shoulder shrug                                                       XII - tongue midline without atrophy or fasciculation      Motor function  Normal muscle bulk and tone; strength 5/5 on all 4 extremities, no pronator drift  + Pill-rolling, low amplitude, high-frequency postural and resting tremor on the right hand (improved)  + Bradykinesia on the right upper extremity  + Atrophy of the intrinsic hand muscles in the right hand (has a history of brachial plexopathy secondary to trauma)     Sensory function Intact to light touch, pinprick on all 4 extremities      Cerebellar Intact fine motor movement. . No ataxia or dysmetria on finger to nose or heel to shin testing      Reflex function DTR 1+ on bilateral UE and LE, symmetric. Negative Babinski      Gait                   normal base and arm swing, + foot eversion on R              ASSESSMENT AND PLAN:       This is a 63 y.o. male who comes in for follow up for severe axonal sensorimotor polyneuropathy, likely from newly diagnosed diabetes. Thankfully, his symptoms remain isolated to the numbness, denies any associated pain or balance problems.   He is on Metformin which was recently increased to 500 mg twice daily. 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 It is improved today, patient reports he was recently taken off risperidone but continue to take Abilify. Since his symptoms are stable, we will reassess him on an annual basis. Jeovanny Gonzalez MD  Neurology and Sleep Medicine  [facility] 22.    Please note that this chart was generated using voice recognition Dragon dictation software. Although every effort was made to ensure the accuracy of this automated transcription, some errors in transcription may have occurred.

## 2021-12-29 ENCOUNTER — OFFICE VISIT (OUTPATIENT)
Dept: NEUROLOGY | Age: 66
End: 2021-12-29
Payer: MEDICARE

## 2021-12-29 VITALS
WEIGHT: 231.8 LBS | SYSTOLIC BLOOD PRESSURE: 101 MMHG | HEIGHT: 75 IN | BODY MASS INDEX: 28.82 KG/M2 | HEART RATE: 80 BPM | DIASTOLIC BLOOD PRESSURE: 66 MMHG

## 2021-12-29 DIAGNOSIS — R73.09 ELEVATED HEMOGLOBIN A1C: ICD-10-CM

## 2021-12-29 DIAGNOSIS — G60.8 PERIPHERAL SENSORY-MOTOR AXONAL POLYNEUROPATHY: Primary | ICD-10-CM

## 2021-12-29 DIAGNOSIS — G21.19 DRUG-INDUCED PARKINSONISM (HCC): ICD-10-CM

## 2021-12-29 DIAGNOSIS — R29.818 DIFFICULTY BALANCING: ICD-10-CM

## 2021-12-29 PROCEDURE — G8427 DOCREV CUR MEDS BY ELIG CLIN: HCPCS | Performed by: PSYCHIATRY & NEUROLOGY

## 2021-12-29 PROCEDURE — G8484 FLU IMMUNIZE NO ADMIN: HCPCS | Performed by: PSYCHIATRY & NEUROLOGY

## 2021-12-29 PROCEDURE — 1036F TOBACCO NON-USER: CPT | Performed by: PSYCHIATRY & NEUROLOGY

## 2021-12-29 PROCEDURE — 3017F COLORECTAL CA SCREEN DOC REV: CPT | Performed by: PSYCHIATRY & NEUROLOGY

## 2021-12-29 PROCEDURE — 99214 OFFICE O/P EST MOD 30 MIN: CPT | Performed by: PSYCHIATRY & NEUROLOGY

## 2021-12-29 PROCEDURE — G8419 CALC BMI OUT NRM PARAM NOF/U: HCPCS | Performed by: PSYCHIATRY & NEUROLOGY

## 2021-12-29 RX ORDER — GLIPIZIDE 5 MG/1
1 TABLET, FILM COATED, EXTENDED RELEASE ORAL DAILY
COMMUNITY
Start: 2021-12-22

## 2021-12-29 NOTE — PROGRESS NOTES
NEUROLOGY CONSULT  Patient Name:       Siva Montano  :        1955  Clinic Visit Date:    2021      Dear Dr. Varghese Wiley had the opportunity to see your patient, Mr. Siva Montano in neurology consultation today. As you know he  is a 61 y.o. right handed  male seen in the clinic today for first time by me as new patient. This patient is transitioning from the care of my colleague, Dr. Celeste Potter, who relocated from our practice. He has severe axonal sensorimotor polyneuropathy with bilateral lower extremity numbness and balance difficulties. Also has comorbid right upper extremity parkinsonian tremor likely from long-term use of Abilify and risperidone. History is also significant for right brachial plexopathy occurred about 10 years ago occurred after trauma at work; has had significant atrophy of right hand intrinsics since after brachial plexopathy. He underwent physical therapy with improvement in some of the symptomatology. EMG, bilateral lower extremities (2018): Axonal sensorimotor peripheral polyneuropathy of severe nature. He comes to clinic stating that he has occasional balance difficulties and upon further questioning; has not had any falls. But also stated that he has not had any physical therapy. Admits to having numbness and tingling associated with diabetic neuropathy. He denied dysesthesias and does not want any additional neuropathic pain medications on a daily basis at this point of time. All items selected indicate a positive finding. Those items not selected are negative.   Constitutional [] Weight loss/gain   [] Fatigue  [] Fever/Chills   HEENT [] Hearing Loss  [] Visual Disturbance  [] Tinnitus  [] Eye pain   Respiratory [] Shortness of Breath  [] Cough  [] Snoring   Cardiovascular [] Chest Pain  [] Palpitations  [] Lightheaded   GI [] Constipation  [] Diarrhea  [] Swallowing change    [] Urinary Frequency  [] Urinary Urgency Musculoskeletal [] Neck pain  [] Back pain  [] Muscle pain  [] Restless legs   Dermatologic [] Skin changes   Neurologic [] Memory loss/confusion  [] Seizures  [x] Trouble walking or imbalance  [] Dizziness  [] Weakness  [x] Numbness  [] Tremors  [] Speech Difficulty  [] Headaches  [] Light Sensitivity  [] Sound Sensitivity   Endocrinology []Excessive thirst  []Excessive hunger   Psychiatric [] Anxiety/Depression  [] Hallucination   Allergy/immunology []Hives/environmental allergies   Hematologic/lymph [] Abnormal bleeding  [] Abnormal bruising     Current Outpatient Medications on File Prior to Visit   Medication Sig Dispense Refill    loratadine (CLARITIN) 10 MG tablet Take 10 mg by mouth daily      fenofibrate (TRICOR) 145 MG tablet Take 1 tablet by mouth daily      XIFAXAN 550 MG tablet Take 1 tablet by mouth 2 times daily      metFORMIN (GLUCOPHAGE-XR) 500 MG extended release tablet Take 1 tablet by mouth 2 times daily       Multiple Vitamins-Minerals (MULTIVITAL-M PO) Take 1 tablet by mouth daily as needed      Omega-3 1000 MG CAPS Take 1 capsule by mouth daily as needed      traZODone (DESYREL) 150 MG tablet Take 150 mg by mouth nightly       ARIPiprazole (ABILIFY) 5 MG tablet Take 5 mg by mouth daily      escitalopram (LEXAPRO) 20 MG tablet Take 20 mg by mouth daily      carvedilol (COREG) 12.5 MG tablet Take 12.5 mg by mouth 2 times daily (with meals)      allopurinol (ZYLOPRIM) 100 MG tablet Take 100 mg by mouth 2 times daily       lisinopril (PRINIVIL;ZESTRIL) 10 MG tablet Take 10 mg by mouth daily       No current facility-administered medications on file prior to visit. Allergies: Mellisa Zaragoza is allergic to haloperidol and related.     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 He has a 1.25 pack-year smoking history. He has never used smokeless tobacco. He reports previous alcohol use. He reports that he does not use drugs. Family History   Problem Relation Age of Onset    Migraines Mother     Alzheimer's Disease Father     Alzheimer's Disease Paternal Grandfather      On exam: vitals: Blood pressure 101/66, pulse 80, height 6' 3\" (1.905 m), weight 231 lb 12.8 oz (105.1 kg). NEUROLOGIC EXAMINATION  GENERAL  Appears comfortable and in no distress   HEENT  NC/ AT   NECK  Supple and no bruits heard   MENTAL STATUS:  Alert, oriented, intact memory, no confusion, normal speech, normal language, no hallucination or delusion   CRANIAL NERVES: II     -      PEERLA, Fundi reveal intact venous pulsations;  Visual fields intact to confrontation  III,IV,VI -  EOMs full, no afferent defect, no                      ANDREW, no ptosis  V     -     Normal facial sensation  VII    -     Normal facial symmetry  VIII   -     Intact hearing  IX,X -     Symmetrical palate  XI    -     Symmetrical shoulder shrug  XII   -     Midline tongue, no atrophy    MOTOR FUNCTION:  significant for good strength of grade 5/5 in bilateral proximal and distal muscle groups of both upper and lower extremities with normal bulk, normal tone and no involuntary movements, no tremor   SENSORY FUNCTION:  Diminished pinprick in the distal lower extremities in asymmetric fashion    CEREBELLAR FUNCTION:  Intact fine motor control over upper limbs   REFLEX FUNCTION:  Symmetric, no perverted reflex, no Babinski sign   STATION and GAIT  Normal station, normal gait         Lab Results   Component Value Date    RUJFVAGR50 482 07/10/2018    VITAMINB6 83.8 07/10/2018      Lab Results   Component Value Date    FOLATE >20.0 07/10/2018       Lab Results   Component Value Date    TSH 3.34 07/28/2020         Lab Results   Component Value Date    LABA1C 6.4 (H) 07/28/2020             Impression and Plan: Mr. Rosetta Mcbride is a 61 y.o. male with   Diabetic peripheral polyneuropathy with resultant numbness in bilateral feet; tolerable without any associated dysesthesias; will take gabapentin 100 mg qpm on as-needed basis only. Will also refer him to PT for gait strengthening. Diabetes is inadequately controlled; presently on Metformin 500 bid  Right upper extremity tremors; likely drug-induced; well controlled. Comorbid depression: presently on Abilify, Lexapro and trazodone  History of right brachial plexopathy related to trauma; with resultant sensory loss and right hand intrinsic muscle atrophy: Stable  Follow-up in clinic in one year as per patient's wishes. But he was advised to contact the clinic asap for any questions or concerns. This note was partially created using voice recognition software and is inherently subject to errors including those of syntax and \"sound alike\" substitutions which may escape proofreading. In such instances, original meaning may be extrapolated by contextual derivation.

## 2022-01-10 RX ORDER — GABAPENTIN 100 MG/1
CAPSULE ORAL
Qty: 90 CAPSULE | Refills: 1 | Status: SHIPPED | OUTPATIENT
Start: 2022-01-10 | End: 2023-01-10

## 2022-06-27 ENCOUNTER — OFFICE VISIT (OUTPATIENT)
Dept: NEUROLOGY | Age: 67
End: 2022-06-27
Payer: MEDICARE

## 2022-06-27 VITALS
DIASTOLIC BLOOD PRESSURE: 69 MMHG | BODY MASS INDEX: 28.35 KG/M2 | SYSTOLIC BLOOD PRESSURE: 101 MMHG | WEIGHT: 228 LBS | HEIGHT: 75 IN | HEART RATE: 82 BPM

## 2022-06-27 DIAGNOSIS — R25.1 TREMOR: ICD-10-CM

## 2022-06-27 DIAGNOSIS — G60.8 PERIPHERAL SENSORY-MOTOR AXONAL POLYNEUROPATHY: Primary | ICD-10-CM

## 2022-06-27 PROCEDURE — G8427 DOCREV CUR MEDS BY ELIG CLIN: HCPCS | Performed by: STUDENT IN AN ORGANIZED HEALTH CARE EDUCATION/TRAINING PROGRAM

## 2022-06-27 PROCEDURE — 99215 OFFICE O/P EST HI 40 MIN: CPT | Performed by: STUDENT IN AN ORGANIZED HEALTH CARE EDUCATION/TRAINING PROGRAM

## 2022-06-27 PROCEDURE — 1036F TOBACCO NON-USER: CPT | Performed by: STUDENT IN AN ORGANIZED HEALTH CARE EDUCATION/TRAINING PROGRAM

## 2022-06-27 PROCEDURE — 1123F ACP DISCUSS/DSCN MKR DOCD: CPT | Performed by: STUDENT IN AN ORGANIZED HEALTH CARE EDUCATION/TRAINING PROGRAM

## 2022-06-27 PROCEDURE — 3017F COLORECTAL CA SCREEN DOC REV: CPT | Performed by: STUDENT IN AN ORGANIZED HEALTH CARE EDUCATION/TRAINING PROGRAM

## 2022-06-27 PROCEDURE — G8417 CALC BMI ABV UP PARAM F/U: HCPCS | Performed by: STUDENT IN AN ORGANIZED HEALTH CARE EDUCATION/TRAINING PROGRAM

## 2022-06-27 RX ORDER — PRIMIDONE 50 MG/1
50 TABLET ORAL 3 TIMES DAILY
Qty: 90 TABLET | Refills: 3 | Status: SHIPPED | OUTPATIENT
Start: 2022-06-27 | End: 2022-07-27 | Stop reason: SDUPTHER

## 2022-06-27 ASSESSMENT — ENCOUNTER SYMPTOMS
EYES NEGATIVE: 1
GASTROINTESTINAL NEGATIVE: 1
RESPIRATORY NEGATIVE: 1

## 2022-06-27 NOTE — PROGRESS NOTES
[address] [address]  Dept: [phone]    PATIENT NAME: Shannan Cassidy  PATIENT MRN: [id number]  PRIMARY CARE PHYSICIAN: Elisa Flores    HPI:      Shannan Cassidy is a 74 y.o. RH male who presents to clinic today for follow up of numbness affecting both feet (onset 1/2018) and frequent falls (onset July 2017) secondary to severe axonal sensorimotor peripheral polyneuropathy. Patient also has tremor in the right hand, likely drug-induced from long-term Abilify and risperidone use. Since last visit, patient notes he has not had any falls. He does not use an assistive device. He notes the neuropathy is overall well controlled. He notes issues arise when he stubs his toe and can't feel it. He notes he moved to a new apartment and he has not been able to find his glucometer. He notes tremor in right hand is getting worse. He notes he spills food while he is eating due to the tremor. Patient is interested in starting a medication to control the tremor at this visit. PREVIOUS WORKUP:     - Reviewed results of prior labs and imaging. EMG 8/21/18: Above mentioned findings are indicating a relatively active and progressive process with combination of denervation and re-innervated motor unit action potentials (MUAPs) with decreased recruitment seen predominantly in distal muscle groups of both lower extremities suggesting axonal sensorimotor peripheral polyneuropathy of severe nature.  Clinical correlation is recommended. This study also demonstrated findings raising concern for bilateral lower lumbosacral radiculopathy of chronic nature.  Obtaining neuroimaging studies of spine would be helpful.  Clinical correlation is recommended.   Current Outpatient Medications   Medication Sig Dispense Refill    loratadine (CLARITIN) 10 MG tablet Take 10 mg by mouth daily      fenofibrate (TRICOR) 145 MG tablet Take 1 tablet by mouth daily      XIFAXAN 550 MG tablet Take 1 tablet by mouth 2 times daily      metFORMIN (GLUCOPHAGE-XR) 500 MG extended release tablet Take 1 tablet by mouth 2 times daily       Multiple Vitamins-Minerals (MULTIVITAL-M PO) Take 1 tablet by mouth daily as needed      Omega-3 1000 MG CAPS Take 1 capsule by mouth daily as needed      traZODone (DESYREL) 150 MG tablet Take 150 mg by mouth nightly       ARIPiprazole (ABILIFY) 5 MG tablet Take 5 mg by mouth daily      escitalopram (LEXAPRO) 20 MG tablet Take 20 mg by mouth daily      carvedilol (COREG) 12.5 MG tablet Take 12.5 mg by mouth 2 times daily (with meals)      allopurinol (ZYLOPRIM) 100 MG tablet Take 100 mg by mouth 2 times daily       lisinopril (PRINIVIL;ZESTRIL) 10 MG tablet Take 10 mg by mouth daily      gabapentin (NEURONTIN) 100 MG capsule Take 1 capsule every evening as needed for painful sensations in lower extremities (Patient not taking: Reported on 6/27/2022) 90 capsule 1    glipiZIDE (GLUCOTROL XL) 5 MG extended release tablet Take 1 tablet by mouth daily (Patient not taking: Reported on 6/27/2022)       No current facility-administered medications for this visit. REVIEW OF SYSTEMS:     Review of Systems   Constitutional: Negative. HENT: Negative. Eyes: Negative. Respiratory: Negative. Cardiovascular: Negative. Gastrointestinal: Negative. Endocrine: Negative. Genitourinary: Negative. Musculoskeletal: Negative. Skin: Negative. Neurological: Positive for tremors and numbness. Negative for dizziness, seizures, syncope, facial asymmetry, speech difficulty, weakness, light-headedness and headaches. Hematological: Negative. Psychiatric/Behavioral: Negative for sleep disturbance.         NEUROLOGICAL EXAMINATION:   VITALS  /69 (Site: Left Upper Arm, Position: Sitting, Cuff Size: Medium Adult)   Pulse 82   Ht 6' 3\" (1.905 m)   Wt 228 lb (103.4 kg)   BMI 28.50 kg/m²     Mental status    Alert and oriented x 3; intact memory with no confusion, speech or language problems; no hallucinations or delusions  Fund of information appropriate for level of education    Cranial nerves    II - visual fields intact to confrontation , fundoscopy showed no  papiledema                                                III, IV, VI - extra-ocular muscles full: no pupillary defect; no ANDREW, no nystagmus, no ptosis   V - normal facial sensation                                                               VII - normal facial symmetry                                                             VIII - intact hearing                                                                             IX, X - symmetrical palate                                                                  XI - symmetrical shoulder shrug                                                       XII - tongue midline without atrophy or fasciculation      Motor function  Normal muscle bulk and tone; strength 5/5 on all 4 extremities  + Pill-rolling, low amplitude, high-frequency postural tremor. + Bradykinesia on the right upper extremity  + Atrophy of the intrinsic hand muscles in the right hand (has a history of brachial plexopathy secondary to trauma)     Sensory function Intact to light touch, pinprick on all 4 extremities      Cerebellar Intact fine motor movement. . No ataxia or dysmetria on finger to nose or heel to shin testing      Reflex function DTR 1+ on bilateral UE and LE, symmetric. Negative Babinski      Gait                   normal base and arm swing, + foot eversion on R             ASSESSMENT / PLAN:   This is a 74 y.o. male who comes in for follow up for severe axonal sensorimotor polyneuropathy, likely from diabetes. His symptoms remain isolated to the numbness, denies any associated pain or balance problems. Denies any falls and is currently well controlled without any medications. He also has a postural tremor on the right hand.  Tremor is getting worse per patient and is interfering with his daily activities. Discussed using weighted utensils and starting primidone 50 mg TID for tremor. I spent a total of 40 minutes on the date of the service which included preparing to see the patient, face-to-face patient care, completing clinical documentation, obtaining and/or reviewing separately obtained history, performing a medically appropriate examination, counseling and educating the patient/family/caregiver and ordering medications, tests, or procedures.      Karissa Laboy MD   Neurology & Sleep Medicine  [facility]

## 2022-07-11 ENCOUNTER — TELEPHONE (OUTPATIENT)
Dept: NEUROLOGY | Age: 67
End: 2022-07-11

## 2022-07-27 ENCOUNTER — TELEPHONE (OUTPATIENT)
Dept: NEUROLOGY | Age: 67
End: 2022-07-27

## 2022-07-27 RX ORDER — PRIMIDONE 50 MG/1
TABLET ORAL
Qty: 150 TABLET | Refills: 0 | Status: SHIPPED | OUTPATIENT
Start: 2022-07-27 | End: 2022-09-25

## 2022-07-27 NOTE — TELEPHONE ENCOUNTER
Maureen Mari called in stating he doesn't believe the Primidone has been beneficial. He has taken it for about a month and usually remembers all of his doses. He did say he does forget the lunch dose sometimes. He said his symptoms are no worse and no better.  Please advise

## 2022-07-27 NOTE — TELEPHONE ENCOUNTER
Can increase the primidone to 2 tablets in the morning, 1 tablet in the afternoon and 2 tablets at night. Prescription has been sent to the pharmacy. The increased dose may cause more drowsiness or dizziness. Please let me know if patient has any other symptoms.

## 2022-07-28 NOTE — TELEPHONE ENCOUNTER
Patient phoned back in regarding the change in his primidone prescription. Patient understood the change and I made him aware that the increase could cause drowsiness and dizziness. I did ask that the patient phone back if any new symptoms appear or current become more severe.

## 2022-09-25 RX ORDER — PRIMIDONE 50 MG/1
TABLET ORAL
Qty: 150 TABLET | Refills: 0 | Status: SHIPPED | OUTPATIENT
Start: 2022-09-25 | End: 2022-10-24 | Stop reason: SDUPTHER

## 2022-10-21 NOTE — TELEPHONE ENCOUNTER
Pharmacy requesting refill of primidone 50 mg.      Medication active on med list yes      Date of last Rx: 9/25/22 with 0 refills          verified by SB, RMA      Date of last appointment 6/27/22    Next Visit Date:  10/24/22

## 2022-10-24 ENCOUNTER — OFFICE VISIT (OUTPATIENT)
Dept: NEUROLOGY | Age: 67
End: 2022-10-24
Payer: MEDICARE

## 2022-10-24 VITALS
BODY MASS INDEX: 28.23 KG/M2 | HEIGHT: 75 IN | WEIGHT: 227 LBS | HEART RATE: 71 BPM | DIASTOLIC BLOOD PRESSURE: 75 MMHG | SYSTOLIC BLOOD PRESSURE: 123 MMHG

## 2022-10-24 DIAGNOSIS — R25.1 TREMOR: ICD-10-CM

## 2022-10-24 DIAGNOSIS — G60.8 PERIPHERAL SENSORY-MOTOR AXONAL POLYNEUROPATHY: Primary | ICD-10-CM

## 2022-10-24 PROCEDURE — 99214 OFFICE O/P EST MOD 30 MIN: CPT | Performed by: STUDENT IN AN ORGANIZED HEALTH CARE EDUCATION/TRAINING PROGRAM

## 2022-10-24 PROCEDURE — 1123F ACP DISCUSS/DSCN MKR DOCD: CPT | Performed by: STUDENT IN AN ORGANIZED HEALTH CARE EDUCATION/TRAINING PROGRAM

## 2022-10-24 PROCEDURE — G8427 DOCREV CUR MEDS BY ELIG CLIN: HCPCS | Performed by: STUDENT IN AN ORGANIZED HEALTH CARE EDUCATION/TRAINING PROGRAM

## 2022-10-24 PROCEDURE — 1036F TOBACCO NON-USER: CPT | Performed by: STUDENT IN AN ORGANIZED HEALTH CARE EDUCATION/TRAINING PROGRAM

## 2022-10-24 PROCEDURE — G8417 CALC BMI ABV UP PARAM F/U: HCPCS | Performed by: STUDENT IN AN ORGANIZED HEALTH CARE EDUCATION/TRAINING PROGRAM

## 2022-10-24 PROCEDURE — 3017F COLORECTAL CA SCREEN DOC REV: CPT | Performed by: STUDENT IN AN ORGANIZED HEALTH CARE EDUCATION/TRAINING PROGRAM

## 2022-10-24 PROCEDURE — G8484 FLU IMMUNIZE NO ADMIN: HCPCS | Performed by: STUDENT IN AN ORGANIZED HEALTH CARE EDUCATION/TRAINING PROGRAM

## 2022-10-24 RX ORDER — PRIMIDONE 50 MG/1
TABLET ORAL
Qty: 270 TABLET | Refills: 1 | Status: SHIPPED | OUTPATIENT
Start: 2022-10-24

## 2022-10-24 ASSESSMENT — ENCOUNTER SYMPTOMS
RESPIRATORY NEGATIVE: 1
GASTROINTESTINAL NEGATIVE: 1
EYES NEGATIVE: 1

## 2022-10-24 NOTE — PROGRESS NOTES
[address] [address]  Dept: [phone]    PATIENT NAME: Sundar Escalante  PATIENT MRN: [id number]  PRIMARY CARE PHYSICIAN: Radha Padron    HPI:      Sundar Escalante is a 74 y.o. RH male who presents to clinic today for follow up of numbness affecting both feet (onset 1/2018) and frequent falls (onset July 2017) secondary to severe axonal sensorimotor peripheral polyneuropathy. Patient also has tremor in the right hand, likely drug-induced from long-term Abilify and risperidone use. Interim History: At last clinic visit in June 2022, primidone 50 mg 3 times a day was started for tremor. He is currently taking taking primidone 100 mg am, 50 pm and 100 QHS. He notes his tremor has improved by 60%. He notes he is able to eat again. He notes he still has numbness in his feet that is not bothersome to patient. Prior History:  Since last visit, patient notes he has not had any falls. He does not use an assistive device. He notes the neuropathy is overall well controlled. He notes issues arise when he stubs his toe and can't feel it. He notes he moved to a new apartment and he has not been able to find his glucometer. He notes tremor in right hand is getting worse. He notes he spills food while he is eating due to the tremor. Patient is interested in starting a medication to control the tremor at this visit. PREVIOUS WORKUP:     - Reviewed results of prior labs and imaging. EMG 8/21/18: Above mentioned findings are indicating a relatively active and progressive process with combination of denervation and re-innervated motor unit action potentials (MUAPs) with decreased recruitment seen predominantly in distal muscle groups of both lower extremities suggesting axonal sensorimotor peripheral polyneuropathy of severe nature. Clinical correlation is recommended.   This study also demonstrated findings raising concern for bilateral lower lumbosacral radiculopathy of chronic nature. Obtaining neuroimaging studies of spine would be helpful. Clinical correlation is recommended. Current Outpatient Medications   Medication Sig Dispense Refill    primidone (MYSOLINE) 50 MG tablet TAKE 2 TABLETS BY MOUTH EVERY MORNING , TAKE 1 TABLET IN THE AFTERNOON, AND TAKE 2 TABLETS BY MOUTH EVERY EVENING 150 tablet 0    gabapentin (NEURONTIN) 100 MG capsule Take 1 capsule every evening as needed for painful sensations in lower extremities (Patient not taking: Reported on 6/27/2022) 90 capsule 1    glipiZIDE (GLUCOTROL XL) 5 MG extended release tablet Take 1 tablet by mouth daily (Patient not taking: Reported on 6/27/2022)      loratadine (CLARITIN) 10 MG tablet Take 10 mg by mouth daily      fenofibrate (TRICOR) 145 MG tablet Take 1 tablet by mouth daily      XIFAXAN 550 MG tablet Take 1 tablet by mouth 2 times daily      metFORMIN (GLUCOPHAGE-XR) 500 MG extended release tablet Take 1 tablet by mouth 2 times daily       Multiple Vitamins-Minerals (MULTIVITAL-M PO) Take 1 tablet by mouth daily as needed      Omega-3 1000 MG CAPS Take 1 capsule by mouth daily as needed      traZODone (DESYREL) 150 MG tablet Take 150 mg by mouth nightly       ARIPiprazole (ABILIFY) 5 MG tablet Take 5 mg by mouth daily      escitalopram (LEXAPRO) 20 MG tablet Take 20 mg by mouth daily      carvedilol (COREG) 12.5 MG tablet Take 12.5 mg by mouth 2 times daily (with meals)      allopurinol (ZYLOPRIM) 100 MG tablet Take 100 mg by mouth 2 times daily       lisinopril (PRINIVIL;ZESTRIL) 10 MG tablet Take 10 mg by mouth daily       No current facility-administered medications for this visit. REVIEW OF SYSTEMS:     Review of Systems   Constitutional: Negative. HENT: Negative. Eyes: Negative. Respiratory: Negative. Cardiovascular: Negative. Gastrointestinal: Negative. Endocrine: Negative. Genitourinary: Negative. Musculoskeletal: Negative. Skin: Negative. Neurological:  Positive for tremors and numbness. Negative for dizziness, seizures, syncope, facial asymmetry, speech difficulty, weakness, light-headedness and headaches. Hematological: Negative. Psychiatric/Behavioral:  Negative for sleep disturbance. NEUROLOGICAL EXAMINATION:   VITALS  There were no vitals taken for this visit. Mental status    Alert and oriented x 3; intact memory with no confusion, speech or language problems; no hallucinations or delusions  Fund of information appropriate for level of education    Cranial nerves    II - visual fields intact to confrontation , fundoscopy showed no  papiledema                                                III, IV, VI - extra-ocular muscles full: no pupillary defect; no ANDREW, no nystagmus, no ptosis   V - normal facial sensation                                                               VII - normal facial symmetry                                                             VIII - intact hearing                                                                             IX, X - symmetrical palate                                                                  XI - symmetrical shoulder shrug                                                       XII - tongue midline without atrophy or fasciculation      Motor function  Normal muscle bulk and tone; strength 5/5 on all 4 extremities   high-frequency postural tremor in RUE. + Bradykinesia on the right upper extremity  + Atrophy of the intrinsic hand muscles in the right hand (has a history of brachial plexopathy secondary to trauma)     Sensory function Intact to light touch, pinprick on all 4 extremities      Cerebellar Intact fine motor movement. . No ataxia or dysmetria on finger to nose or heel to shin testing. Reflex function DTR 1+ on bilateral UE and LE, symmetric.  Negative Babinski      Gait                   normal base and arm swing, + foot eversion on R             ASSESSMENT / PLAN:   This is a 74 y.o. male who comes in for follow up for severe axonal sensorimotor polyneuropathy, likely from diabetes. His symptoms remain isolated to the numbness, denies any associated pain or balance problems. Denies any falls and is currently well controlled without any medications. He also has a postural tremor on the right hand. Tremor is getting worse per patient and is interfering with his daily activities. Patient was started on primidone 50 mg 3 times a day for essential tremor. Tremor has significantly improved since starting primidone. He is currently taking primidone 100 mg in the morning, 50 mg in the afternoon and 100 mg in the evening. Patient's been tolerating it well and denies any side effects. Discussed increasing it to 150 mg 3 times a day over the next few weeks. Titration schedule discussed and given to patient.       Jesse Pruitt MD   Neurology & Sleep Medicine  [facility]

## 2022-10-24 NOTE — PATIENT INSTRUCTIONS
Increase primidone to 150 mg at bedtime, after a few days can increase morning dose to 150 mg. If doing well, can increase afternoon dose to 100 mg. After a few days, if still doing well can increase afternoon dose to 150 mg daily.

## 2022-10-25 RX ORDER — PRIMIDONE 50 MG/1
TABLET ORAL
Qty: 150 TABLET | Refills: 0 | OUTPATIENT
Start: 2022-10-25

## 2022-12-13 NOTE — TELEPHONE ENCOUNTER
Pharmacy requesting refill of primidone (MYSOLINE) 50 MG tablet      Medication active on med list yes      Date of last Rx: 10/24/2022 with 1 refills          verified by LOGAN REDDY      Date of last appointment 10/24/2022    Next Visit Date:  4/24/2023      RX NOT DUE UNTIL 12/23/2022

## 2022-12-14 RX ORDER — PRIMIDONE 50 MG/1
TABLET ORAL
Qty: 180 TABLET | Refills: 3 | Status: SHIPPED | OUTPATIENT
Start: 2022-12-14

## 2023-02-21 RX ORDER — PRIMIDONE 50 MG/1
TABLET ORAL
Qty: 270 TABLET | Refills: 0 | Status: SHIPPED | OUTPATIENT
Start: 2023-02-21

## 2023-02-21 NOTE — TELEPHONE ENCOUNTER
Please send for Dr. Kasia Garces requesting refill of primidone 50 mg.      Medication active on med list yes      Date of last Rx: 12/14/2022 with 3 refills          verified by SB, RMA      Date of last appointment 10/24/2022    Next Visit Date:  4/24/2023

## 2023-03-31 NOTE — TELEPHONE ENCOUNTER
Pharmacy requesting refill of primidone 50 mg.      Medication active on med list yes      Date of last Rx: 2/21/2023 with 0 refills          verified by SB, RMA      Date of last appointment 10/24/2022    Next Visit Date:  4/10/2023

## 2023-04-03 RX ORDER — PRIMIDONE 50 MG/1
TABLET ORAL
Qty: 270 TABLET | Refills: 0 | Status: SHIPPED | OUTPATIENT
Start: 2023-04-03

## 2023-10-04 ENCOUNTER — OFFICE VISIT (OUTPATIENT)
Dept: NEUROLOGY | Age: 68
End: 2023-10-04
Payer: MEDICARE

## 2023-10-04 VITALS
HEIGHT: 75 IN | DIASTOLIC BLOOD PRESSURE: 70 MMHG | WEIGHT: 236 LBS | HEART RATE: 88 BPM | SYSTOLIC BLOOD PRESSURE: 107 MMHG | BODY MASS INDEX: 29.34 KG/M2

## 2023-10-04 DIAGNOSIS — R29.818 DIFFICULTY BALANCING: ICD-10-CM

## 2023-10-04 DIAGNOSIS — G60.8 PERIPHERAL SENSORY-MOTOR AXONAL POLYNEUROPATHY: ICD-10-CM

## 2023-10-04 DIAGNOSIS — G25.1 DRUG-INDUCED TREMOR: ICD-10-CM

## 2023-10-04 DIAGNOSIS — R25.1 TREMOR: Primary | ICD-10-CM

## 2023-10-04 PROCEDURE — 1123F ACP DISCUSS/DSCN MKR DOCD: CPT | Performed by: PSYCHIATRY & NEUROLOGY

## 2023-10-04 PROCEDURE — 99214 OFFICE O/P EST MOD 30 MIN: CPT | Performed by: PSYCHIATRY & NEUROLOGY

## 2023-10-04 RX ORDER — PRIMIDONE 50 MG/1
TABLET ORAL
Qty: 990 TABLET | Refills: 2 | Status: SHIPPED | OUTPATIENT
Start: 2023-10-04

## 2023-10-04 RX ORDER — ORAL SEMAGLUTIDE 3 MG/1
3 TABLET ORAL EVERY MORNING
COMMUNITY
Start: 2023-08-31

## 2023-10-04 NOTE — PROGRESS NOTES
NEUROLOGY FOLLOW UP VISIT  Patient Name:       Liz Kumar  :        1955  Clinic Visit Date:    10/4/2023        Dear Dr. Yanci Kinsey saw  Mr. Liz Kumar in follow up visit today in continuation of neurologic care. As you know he  is a 79 y.o. right handed male with chronic gait difficulties with recurrent falls, onset 2017; numbness in both feet, onset 2018 found to have severe axonal sensorimotor polyneuropathy. 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 Has been on primidone for tremor reduction on slow dose escalation. Since last visit in April; he has increased the dose and has been taking primidone 150 mg 3 times daily. Initially it helped for tremor control but not \"all the time\". He has been having increased tremor during lunchtime and dinnertime. He is wondering about dose adjustment as he is not having any side effects from medication. He has not had any first dose effect problems from this medication as discussed earlier. History is also significant for right brachial plexopathy occurred about 10 years ago occurred after trauma at work; has had significant atrophy of right hand intrinsics since after brachial plexopathy. He underwent physical therapy with improvement in some of his symptomatology. EMG, bilateral lower extremities (2018): Axonal sensorimotor peripheral polyneuropathy of severe nature. All items selected indicate a positive finding. Those items not selected are negative.   Constitutional [] Weight loss/gain   [] Fatigue  [] Fever/Chills   HEENT [] Hearing Loss  [] Visual Disturbance  [] Tinnitus  [] Eye pain   Respiratory [] Shortness of Breath  [] Cough  [] Snoring   Cardiovascular [] Chest Pain  [] Palpitations  [] Lightheaded   GI [] Constipation  [] Diarrhea  [] Swallowing change    [] Urinary Frequency  [] Urinary Urgency   Musculoskeletal [] Neck pain  [] Back pain  [] Muscle pain  []

## 2024-01-05 ENCOUNTER — TELEPHONE (OUTPATIENT)
Dept: NEUROLOGY | Age: 69
End: 2024-01-05

## 2024-01-05 NOTE — TELEPHONE ENCOUNTER
Jamarcus called in stating that he is taking Primidone 50 mg isn't working for him. He said that he is taking 11 a day 3 in the morning 4 in the afternoon and 4 at night. He stated that he can hardly get food in his mouth. Can you please advise

## 2024-01-05 NOTE — TELEPHONE ENCOUNTER
Scott  Please let the patient know that Primidone dose needs to be increased slowly.   He can take extra tab in am for next one week and continue afternoon dose and night time dose as usual.   He can call us next week and depending on how he is tolerating it; will increase the dose.  Thank you.   -dr. steward

## 2024-01-18 DIAGNOSIS — R25.1 TREMOR: Primary | ICD-10-CM

## 2024-01-18 RX ORDER — PRIMIDONE 250 MG/1
TABLET ORAL
Qty: 90 TABLET | Refills: 1 | Status: SHIPPED | OUTPATIENT
Start: 2024-01-18 | End: 2024-05-27

## 2024-01-19 ENCOUNTER — APPOINTMENT (OUTPATIENT)
Dept: GENERAL RADIOLOGY | Age: 69
End: 2024-01-19
Payer: OTHER MISCELLANEOUS

## 2024-01-19 ENCOUNTER — HOSPITAL ENCOUNTER (EMERGENCY)
Age: 69
Discharge: HOME | End: 2024-01-19
Attending: EMERGENCY MEDICINE
Payer: OTHER MISCELLANEOUS

## 2024-01-19 ENCOUNTER — APPOINTMENT (OUTPATIENT)
Dept: CT IMAGING | Age: 69
End: 2024-01-19
Payer: OTHER MISCELLANEOUS

## 2024-01-19 VITALS
RESPIRATION RATE: 17 BRPM | SYSTOLIC BLOOD PRESSURE: 143 MMHG | BODY MASS INDEX: 29 KG/M2 | DIASTOLIC BLOOD PRESSURE: 98 MMHG | TEMPERATURE: 98.1 F | WEIGHT: 232 LBS | OXYGEN SATURATION: 96 % | HEART RATE: 101 BPM
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
## 2024-01-19 LAB
ANION GAP SERPL CALCULATED.3IONS-SCNC: 13 MMOL/L (ref 9–17)
BASOPHILS # BLD: 0.05 K/UL (ref 0–0.2)
BASOPHILS NFR BLD: 1 % (ref 0–2)
BUN SERPL-MCNC: 29 MG/DL (ref 8–23)
CALCIUM SERPL-MCNC: 9.4 MG/DL (ref 8.6–10.4)
CHLORIDE SERPL-SCNC: 102 MMOL/L (ref 98–107)
CO2 SERPL-SCNC: 22 MMOL/L (ref 20–31)
CREAT SERPL-MCNC: 1.1 MG/DL (ref 0.7–1.2)
EOSINOPHIL # BLD: 0.11 K/UL (ref 0–0.44)
EOSINOPHILS RELATIVE PERCENT: 1 % (ref 1–4)
ERYTHROCYTE [DISTWIDTH] IN BLOOD BY AUTOMATED COUNT: 12.3 % (ref 11.8–14.4)
GFR SERPL CREATININE-BSD FRML MDRD: >60 ML/MIN/1.73M2
GLUCOSE SERPL-MCNC: 171 MG/DL (ref 70–99)
HCT VFR BLD AUTO: 53 % (ref 40.7–50.3)
HGB BLD-MCNC: 17.3 G/DL (ref 13–17)
IMM GRANULOCYTES # BLD AUTO: 0.06 K/UL (ref 0–0.3)
IMM GRANULOCYTES NFR BLD: 1 %
LYMPHOCYTES NFR BLD: 1.36 K/UL (ref 1.1–3.7)
LYMPHOCYTES RELATIVE PERCENT: 13 % (ref 24–43)
MCH RBC QN AUTO: 30.2 PG (ref 25.2–33.5)
MCHC RBC AUTO-ENTMCNC: 32.6 G/DL (ref 28.4–34.8)
MCV RBC AUTO: 92.7 FL (ref 82.6–102.9)
MONOCYTES NFR BLD: 0.73 K/UL (ref 0.1–1.2)
MONOCYTES NFR BLD: 7 % (ref 3–12)
NEUTROPHILS NFR BLD: 77 % (ref 36–65)
NEUTS SEG NFR BLD: 7.93 K/UL (ref 1.5–8.1)
NRBC BLD-RTO: 0 PER 100 WBC
PLATELET # BLD AUTO: 199 K/UL (ref 138–453)
PMV BLD AUTO: 9.6 FL (ref 8.1–13.5)
POTASSIUM SERPL-SCNC: 4.2 MMOL/L (ref 3.7–5.3)
RBC # BLD AUTO: 5.72 M/UL (ref 4.21–5.77)
SODIUM SERPL-SCNC: 137 MMOL/L (ref 135–144)
WBC OTHER # BLD: 10.2 K/UL (ref 3.5–11.3)

## 2024-01-19 PROCEDURE — 99284 EMERGENCY DEPT VISIT MOD MDM: CPT | Performed by: EMERGENCY MEDICINE

## 2024-01-19 PROCEDURE — 72125 CT NECK SPINE W/O DYE: CPT

## 2024-01-19 PROCEDURE — 73120 X-RAY EXAM OF HAND: CPT

## 2024-01-19 PROCEDURE — 85025 COMPLETE CBC W/AUTO DIFF WBC: CPT

## 2024-01-19 PROCEDURE — 12001 RPR S/N/AX/GEN/TRNK 2.5CM/<: CPT | Performed by: EMERGENCY MEDICINE

## 2024-01-19 PROCEDURE — 70450 CT HEAD/BRAIN W/O DYE: CPT

## 2024-01-19 PROCEDURE — 80048 BASIC METABOLIC PNL TOTAL CA: CPT

## 2024-01-19 RX ORDER — CEPHALEXIN 500 MG/1
500 CAPSULE ORAL 4 TIMES DAILY
Qty: 28 CAPSULE | Refills: 0 | Status: SHIPPED | OUTPATIENT
Start: 2024-01-19 | End: 2024-01-19

## 2024-01-19 RX ORDER — CEPHALEXIN 500 MG/1
500 CAPSULE ORAL 4 TIMES DAILY
Qty: 28 CAPSULE | Refills: 0 | Status: SHIPPED | OUTPATIENT
Start: 2024-01-19 | End: 2024-01-26

## 2024-01-19 ASSESSMENT — PAIN DESCRIPTION - LOCATION: LOCATION: NECK

## 2024-01-19 ASSESSMENT — PAIN - FUNCTIONAL ASSESSMENT: PAIN_FUNCTIONAL_ASSESSMENT: 0-10
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
## 2024-01-20 NOTE — PROCEDURES
PROCEDURE NOTE - LACERATION CLOSURE    PATIENT NAME: Jamarcus Rose  MEDICAL RECORD NO. [id number]  DATE: 1/19/2024  ATTENDING PHYSICIAN: Javier    PREOPERATIVE DIAGNOSIS: Laceration(s) as follows:  -Location: L hand  -Length: 1.5 cm  -Layered closure: No    POSTOPERATIVE DIAGNOSIS:  Same  PROCEDURE PERFORMED:  Suture closure of laceration  PERFORMING PHYSICIAN: Abdelrahman Lopez DO  ANESTHESIA:  Local utilizing  Lidocaine 1% without epinephrine  ESTIMATED BLOOD LOSS:  Less than 25 ml.     DISCUSSION:  Jamarcus Rose is a 68 y.o.-year-old male. Patient requires laceration repair. The history and physical examination were reviewed and confirmed.      CONSENT: The patient provided verbal consent for this procedure.     PROCEDURE:  Prior to starting, the procedure and patient were confirmed by those present.  The wound area was cleansed with povidone iodine scrub and draped in a sterile fashion. The wound area was anesthetized with Lidocaine 1% without epinephrine. The wound was explored with the following results No foreign bodies found, No tendon laceration seen. The wound was repaired with 4-0 Prolene using interrupted sutures.  The wound was dressed with a sterile dressing.      All sponge, instrument and needle counts were correct at the completion of the procedure.      The patient tolerated the procedure well.     SUTURE COUNT:  Suture count: 3    COMPLICATIONS:  None     Abdelrahman Lopez DO  7:54 PM, 1/19/24

## 2024-01-20 NOTE — PROGRESS NOTES
[facility] [place]    Shift date: 1/19/2024  Shift day: Friday   Shift # 2    Room # 42/42   Name: Jamarcus Rose                Confucianism:    Place of Episcopal:     Referral: Routine Visit    Admit Date & Time: 1/19/2024  1:34 PM    Assessment:  Jamarcus Rose is a 68 y.o. male in the hospital     Intervention:  Writer introduced self and title as . Patient did not appear to mind  presence and engaged in conversation. Patient appeared calm and coping when discussing the days events which led to his hospital visit. Patient expressed needs/concerns for others during conversation.  provided a supportive presence through active listening and words of affirmation.    Outcome:  Patient appeared receptive to  visit and continues processing the days events.    Plan:  Chaplains will remain available to offer spiritual and emotional support as needed.      Electronically signed by Chaplain Alayna, on 1/19/2024 at 7:49 PM.  [facility]  [phone]

## 2024-01-20 NOTE — ED NOTES
FABRICE attempted to schedule pt transport via pt insurance, per [place] access to care staff, pt does not have transport at this time. FABRICE scheduled pt a black and white cab to home. Conf #[id number]
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
## 2024-04-23 NOTE — PROGRESS NOTES
Comorbid affective disorder with depression: stable on Abilify and trazodone  Hx of Rt brachial plexopathy sec to trauma --> rt hand atrophy; weakness stable and \"not able to play piano\".   Conservative measures discussed and advised him to keep up with his hobby such as playing electric piano.     Follow up in 6 months      This note was partially created using voice recognition software and is inherently subject to errors including those of syntax and \"sound alike\" substitutions which may escape proofreading.  In such instances, original meaning may be extrapolated by contextual derivation.

## 2024-04-24 ENCOUNTER — OFFICE VISIT (OUTPATIENT)
Dept: NEUROLOGY | Age: 69
End: 2024-04-24
Payer: MEDICARE

## 2024-04-24 VITALS
BODY MASS INDEX: 28.72 KG/M2 | SYSTOLIC BLOOD PRESSURE: 115 MMHG | DIASTOLIC BLOOD PRESSURE: 74 MMHG | HEART RATE: 87 BPM | WEIGHT: 231 LBS | HEIGHT: 75 IN

## 2024-04-24 DIAGNOSIS — R25.1 TREMOR: Primary | ICD-10-CM

## 2024-04-24 DIAGNOSIS — G25.1 DRUG-INDUCED TREMOR: ICD-10-CM

## 2024-04-24 PROCEDURE — 99214 OFFICE O/P EST MOD 30 MIN: CPT | Performed by: PSYCHIATRY & NEUROLOGY

## 2024-04-24 PROCEDURE — 1123F ACP DISCUSS/DSCN MKR DOCD: CPT | Performed by: PSYCHIATRY & NEUROLOGY

## 2024-04-24 RX ORDER — PRIMIDONE 50 MG/1
TABLET ORAL
Qty: 720 TABLET | Refills: 1 | Status: SHIPPED | OUTPATIENT
Start: 2024-04-24

## 2024-04-24 RX ORDER — OXYBUTYNIN CHLORIDE 5 MG/1
1 TABLET, EXTENDED RELEASE ORAL DAILY
COMMUNITY
Start: 2024-01-17

## 2024-04-24 RX ORDER — GLIPIZIDE 10 MG/1
10 TABLET, FILM COATED, EXTENDED RELEASE ORAL DAILY
COMMUNITY
Start: 2024-04-23

## 2024-04-24 RX ORDER — BUPROPION HYDROCHLORIDE 300 MG/1
300 TABLET ORAL EVERY MORNING
COMMUNITY
Start: 2024-04-23

## 2024-04-24 RX ORDER — TAMSULOSIN HYDROCHLORIDE 0.4 MG/1
1 CAPSULE ORAL NIGHTLY
COMMUNITY
Start: 2024-01-17

## 2024-04-24 RX ORDER — PRIMIDONE 250 MG/1
TABLET ORAL
Qty: 90 TABLET | Refills: 1 | Status: SHIPPED | OUTPATIENT
Start: 2024-04-24 | End: 2024-09-01

## 2024-06-17 ENCOUNTER — TELEPHONE (OUTPATIENT)
Dept: NEUROLOGY | Age: 69
End: 2024-06-17

## 2024-06-17 NOTE — TELEPHONE ENCOUNTER
Patient called into the office stating that he is experiencing increased tremors. He is currently taking Primidone 200mg in the morning, 200mg in the afternoon, and 250mg in the evening. He is wondering if his primidone can be increased. Please advise.

## 2024-06-18 NOTE — TELEPHONE ENCOUNTER
Yes, he can take extra dose of 50 mg Primidone in afternoon.     He can use up present supply of 50 mg Primidone tablets.    He can try that for one week and if he is not having any adv effects; then I will send a new script with that change.     Please let the patient know that   Thank you.   -dr. steward

## 2024-07-09 NOTE — TELEPHONE ENCOUNTER
Yes, he can do that; but every time, with every dose increase; side effects of dizziness and drowsiness are very common for first couple of days with primidone medication.    Please let the patient know that   Thank you.   -dr. steward

## 2024-07-10 NOTE — TELEPHONE ENCOUNTER
I called Jamarcus and gave him the message.   He is asking if he can now have a RX for the Primidone 250 mg. 1 tid since that is his dose.  He would appreciate RX going to [id number]

## 2024-07-12 RX ORDER — PRIMIDONE 250 MG/1
250 TABLET ORAL 3 TIMES DAILY
Qty: 90 TABLET | Refills: 0 | Status: SHIPPED | OUTPATIENT
Start: 2024-07-12

## 2024-07-23 NOTE — PROGRESS NOTES
lb), SpO2 95 %.    NEUROLOGIC EXAMINATION  GENERAL  Appears comfortable and in no distress   HEENT  NC/ AT   NECK  Supple and no bruits heard   MENTAL STATUS:  Alert, oriented, intact memory, no confusion, normal speech, normal language, no hallucination or delusion   CRANIAL NERVES: II     -      PEERLA, Fundi reveal intact venous pulsations; Visual fields intact to confrontation  III,IV,VI -  EOMs full, no afferent defect, no                      ANDREW, no ptosis  V     -     Normal facial sensation  VII    -     Normal facial symmetry  VIII   -     Intact hearing  IX,X -     Symmetrical palate  XI    -     Symmetrical shoulder shrug  XII   -     Midline tongue, no atrophy    MOTOR FUNCTION:  significant for good strength of grade 5/5 in bilateral proximal muscle groups of both upper extremities. He has chronic Rt hand atrophy sec to chronic traumatic brachial plexopathy.    Otherwise 5/5 in both lower extremity muscle groups.    Increased tremor on extended posture of both upper extremities; right greater than left; no associated cogwheeling and no bradykinesia noted.  No resting tremor noted   SENSORY FUNCTION:  Diminished pinprick in the distal lower extremities in asymmetric fashion    CEREBELLAR FUNCTION:  Intact fine motor control over upper limbs   REFLEX FUNCTION:  Symmetric, no perverted reflex, no Babinski sign   STATION and GAIT  Normal station, broad-based gait; difficulty with a tandem gait; good arm swing and no resting tremor noted.         Lab Results   Component Value Date    CQWPJQWA46 482 07/10/2018    VITAMINB6 83.8 07/10/2018      Lab Results   Component Value Date    FOLATE >20.0 07/10/2018       Lab Results   Component Value Date    TSH 3.34 07/28/2020         Lab Results   Component Value Date    LABA1C 6.4 (H) 07/28/2020             Impression and Plan: Mr. Jamarcus Rose is a 69 y.o. male with   Exertional tremors, Rt hand tremor; stable on recently increased dose of Primidone at 250 mg tid; to

## 2024-07-24 ENCOUNTER — OFFICE VISIT (OUTPATIENT)
Dept: NEUROLOGY | Age: 69
End: 2024-07-24
Payer: MEDICARE

## 2024-07-24 VITALS
HEIGHT: 75 IN | SYSTOLIC BLOOD PRESSURE: 118 MMHG | WEIGHT: 232 LBS | OXYGEN SATURATION: 95 % | HEART RATE: 94 BPM | BODY MASS INDEX: 28.85 KG/M2 | DIASTOLIC BLOOD PRESSURE: 80 MMHG

## 2024-07-24 DIAGNOSIS — G25.1 DRUG-INDUCED TREMOR: Primary | ICD-10-CM

## 2024-07-24 DIAGNOSIS — G60.8 PERIPHERAL SENSORY-MOTOR AXONAL POLYNEUROPATHY: ICD-10-CM

## 2024-07-24 DIAGNOSIS — R29.818 DIFFICULTY BALANCING: ICD-10-CM

## 2024-07-24 DIAGNOSIS — S14.3XXA TRAUMATIC BRACHIAL PLEXOPATHY: ICD-10-CM

## 2024-07-24 PROCEDURE — 99214 OFFICE O/P EST MOD 30 MIN: CPT | Performed by: PSYCHIATRY & NEUROLOGY

## 2024-07-24 PROCEDURE — 1123F ACP DISCUSS/DSCN MKR DOCD: CPT | Performed by: PSYCHIATRY & NEUROLOGY

## 2024-07-24 RX ORDER — PRIMIDONE 250 MG/1
250 TABLET ORAL 3 TIMES DAILY
Qty: 270 TABLET | Refills: 1 | Status: SHIPPED | OUTPATIENT
Start: 2024-07-24

## 2024-07-24 RX ORDER — PRIMIDONE 250 MG/1
250 TABLET ORAL 3 TIMES DAILY
Qty: 90 TABLET | Refills: 1 | Status: SHIPPED | OUTPATIENT
Start: 2024-07-24 | End: 2024-07-24 | Stop reason: CLARIF

## 2024-11-20 ENCOUNTER — TELEPHONE (OUTPATIENT)
Dept: NEUROLOGY | Age: 69
End: 2024-11-20

## 2024-11-20 NOTE — TELEPHONE ENCOUNTER
Please let the patient know that we can try Topamax to help for tremors, if he hasn't had any kidney stones in the past.  Thank you.   -dr. steward

## 2024-11-20 NOTE — TELEPHONE ENCOUNTER
Patient states he is having increased tremors and some instability. He isn't falling but he stated he notices he will have to use the wall to support him from losing his balance at times. Patient verified he is taking his primidone 250 mg TID. He is scheduled for an appointment on 2/5/25 and is added to the waitlist. Any recommendations in the meantime? Please advise, thank you!

## 2024-11-21 DIAGNOSIS — G25.2 ACTION TREMOR: Primary | ICD-10-CM

## 2024-11-21 DIAGNOSIS — G25.1 DRUG-INDUCED TREMOR: ICD-10-CM

## 2024-11-21 RX ORDER — TOPIRAMATE 25 MG/1
TABLET, FILM COATED ORAL
Qty: 60 TABLET | Refills: 3 | Status: SHIPPED | OUTPATIENT
Start: 2024-11-21

## 2024-11-21 NOTE — TELEPHONE ENCOUNTER
Patient is agreeable to trying the topamax. No hx of kidney stones. Discussed side effects like drowsiness, brain fog and taste changes, advised to call with any concerns. He voiced understanding.

## 2024-11-21 NOTE — TELEPHONE ENCOUNTER
Please let the patient know that I did send prescription for Topamax 25 mg once every evening for 1 week and then twice daily.  Thank you.  -Dr. Ferraro

## 2024-11-26 NOTE — TELEPHONE ENCOUNTER
Patient called in wanting to verify that he is supposed to still be taking the primidone with the topamax. I advised that it was not noted to discontinue. Patient states he is not having symptoms of excessive drowsiness or anything of the sort. He just wanted to clarify.

## 2024-11-27 NOTE — TELEPHONE ENCOUNTER
Called and spoke to patient; he was told that he should continue taking primidone 250 mg tid and Topamax 25 mg bid as discussed.   -dr steward    Also, happy Thanksgiving and happy holidays, Jagjit

## 2025-01-17 DIAGNOSIS — G25.1 DRUG-INDUCED TREMOR: ICD-10-CM

## 2025-01-17 NOTE — TELEPHONE ENCOUNTER
Pharmacy requesting refill of primidone (MYSOLINE) 250 MG tablet      Medication active on med list yes      Date of last Rx: 7/24/2024 with 1 refills          verified by LOGAN REDDY      Date of last appointment 7/24/2024    Next Visit Date:  2/5/2025

## 2025-01-19 RX ORDER — PRIMIDONE 250 MG/1
250 TABLET ORAL 3 TIMES DAILY
Qty: 90 TABLET | Refills: 5 | Status: SHIPPED | OUTPATIENT
Start: 2025-01-19

## 2025-02-05 ENCOUNTER — OFFICE VISIT (OUTPATIENT)
Dept: NEUROLOGY | Age: 70
End: 2025-02-05
Payer: MEDICARE

## 2025-02-05 VITALS
WEIGHT: 237 LBS | DIASTOLIC BLOOD PRESSURE: 80 MMHG | BODY MASS INDEX: 29.47 KG/M2 | HEART RATE: 90 BPM | SYSTOLIC BLOOD PRESSURE: 131 MMHG | HEIGHT: 75 IN

## 2025-02-05 DIAGNOSIS — G25.1 DRUG-INDUCED TREMOR: ICD-10-CM

## 2025-02-05 DIAGNOSIS — G25.2 ACTION TREMOR: ICD-10-CM

## 2025-02-05 DIAGNOSIS — F39 AFFECTIVE DISORDER (HCC): ICD-10-CM

## 2025-02-05 DIAGNOSIS — G25.0 DISABLING ESSENTIAL TREMOR: Primary | ICD-10-CM

## 2025-02-05 DIAGNOSIS — G60.8 PERIPHERAL SENSORY-MOTOR AXONAL POLYNEUROPATHY: ICD-10-CM

## 2025-02-05 PROCEDURE — 99214 OFFICE O/P EST MOD 30 MIN: CPT | Performed by: PSYCHIATRY & NEUROLOGY

## 2025-02-05 PROCEDURE — 1123F ACP DISCUSS/DSCN MKR DOCD: CPT | Performed by: PSYCHIATRY & NEUROLOGY

## 2025-02-05 RX ORDER — SEMAGLUTIDE 0.68 MG/ML
INJECTION, SOLUTION SUBCUTANEOUS
COMMUNITY
Start: 2025-01-31

## 2025-02-05 RX ORDER — UBIQUINOL 100 MG
1 CAPSULE ORAL 2 TIMES DAILY
COMMUNITY

## 2025-02-05 RX ORDER — LOPERAMIDE HYDROCHLORIDE 2 MG/1
CAPSULE ORAL
COMMUNITY
Start: 2025-01-17

## 2025-02-05 RX ORDER — BACLOFEN 10 MG/1
TABLET ORAL
COMMUNITY

## 2025-02-05 RX ORDER — PRIMIDONE 250 MG/1
250 TABLET ORAL 3 TIMES DAILY
Qty: 90 TABLET | Refills: 5 | Status: SHIPPED | OUTPATIENT
Start: 2025-02-05

## 2025-02-05 RX ORDER — TOPIRAMATE 25 MG/1
TABLET, FILM COATED ORAL
Qty: 120 TABLET | Refills: 3 | Status: SHIPPED | OUTPATIENT
Start: 2025-02-05

## 2025-02-05 RX ORDER — CEPHALEXIN 500 MG/1
1 CAPSULE ORAL
COMMUNITY

## 2025-02-05 NOTE — PROGRESS NOTES
NEUROLOGY FOLLOW UP VISIT  Patient Name:       Jamarcus Rose  :        1955  Clinic Visit Date:    2025  LOV: 2024        Dear Dr. Monteiro, JOSE Caro - NP     I saw  Mr. Jamarcus Rose in follow up visit today in continuation of neurologic care.   As you know he  is a 69 y.o. right handed male with chronic gait difficulties with recurrent falls, onset 2017; numbness in both feet, onset 2018 found to have severe axonal sensorimotor polyneuropathy.    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  He reports no adverse effects such as severe dizziness from this medication. Additionally, he is taking topiramate 25 mg twice daily, which initially provided relief but its efficacy appears to be diminishing over time. He has not experienced any episodes of kidney stones. He has expressed interest in obtaining a prescription for weighted silverware, as recommended by his occupational therapist. He reports difficulty with spoons but not with cups or glasses.  He has a history of low pressure glaucoma, which was successfully treated with laser surgery more than a year ago. Currently, he is not experiencing any glaucoma-related issues.  He also understood that topiramate can aggravate glaucoma in certain cases and should he have any vision changes, he will stop taking topiramate and call us.  He is wondering about dose optimization for improvement in tremors.        2024 visit:  Patient presents to clinic stating that he has been on primidone 250 mg 3 times daily and has been tolerating it well without any anticipated adverse effects.      2024 visit:  Patient presented to clinic stating that he has increased Primidone dose and presently taking primidone 150 mg 3 times daily.  Initially it helped for tremor control but not \"all the time\".  He has been having increased tremor during lunchtime and

## 2025-02-13 DIAGNOSIS — G25.2 ACTION TREMOR: ICD-10-CM

## 2025-02-13 NOTE — TELEPHONE ENCOUNTER
Prescription sent on 2/5/2025 did not go through to the pharmacy due to an electronic error.     Pharmacy requesting refill of Topamax 25.      Medication active on med list yes      Date of last Rx: 2/5/2025 with 3 refills          verified by AMY SHAH      Date of last appointment 2/5/2025    Next Visit Date:  Visit date not found

## 2025-02-14 RX ORDER — TOPIRAMATE 25 MG/1
TABLET, FILM COATED ORAL
Qty: 120 TABLET | Refills: 3 | Status: SHIPPED | OUTPATIENT
Start: 2025-02-14

## 2025-05-14 DIAGNOSIS — G25.2 ACTION TREMOR: ICD-10-CM

## 2025-05-14 RX ORDER — TOPIRAMATE 25 MG/1
TABLET, FILM COATED ORAL
Qty: 120 TABLET | Refills: 2 | Status: SHIPPED | OUTPATIENT
Start: 2025-05-14

## 2025-05-14 NOTE — TELEPHONE ENCOUNTER
Pharmacy requesting refill of topiramate 25 mg.      Medication active on med list yes      Date of last Rx: 2/14/2025 with 3 refills          verified by LOGAN PENA      Date of last appointment 2/5/2025    Next Visit Date:  8/6/2025

## 2025-06-09 ENCOUNTER — TELEPHONE (OUTPATIENT)
Dept: NEUROLOGY | Age: 70
End: 2025-06-09

## 2025-06-09 NOTE — TELEPHONE ENCOUNTER
Jamarcus called the office and stated that his nerves are worse and his tremors are uncontrollable. They have progressed to the point where food is falling off his fork when trying to eat something. He would like to know if a medication adjustment can be made to help.     Please review and advise. Thank you.

## 2025-06-10 DIAGNOSIS — G25.2 ACTION TREMOR: ICD-10-CM

## 2025-06-10 RX ORDER — TOPIRAMATE 25 MG/1
TABLET, FILM COATED ORAL
Qty: 180 TABLET | Refills: 2 | Status: SHIPPED | OUTPATIENT
Start: 2025-06-10

## 2025-06-10 NOTE — TELEPHONE ENCOUNTER
Please let the patient know that he can increase the dose of Topamax from 25 mg 2 tablets bid to 25 mg 3 tab bid; I did sign the script with changes.   Thank you.  -Dr. Ferraro

## 2025-08-06 DIAGNOSIS — G25.1 DRUG-INDUCED TREMOR: ICD-10-CM

## 2025-08-06 DIAGNOSIS — G25.0 DISABLING ESSENTIAL TREMOR: ICD-10-CM

## 2025-08-08 RX ORDER — PRIMIDONE 250 MG/1
250 TABLET ORAL 3 TIMES DAILY
Qty: 84 TABLET | Refills: 2 | Status: SHIPPED | OUTPATIENT
Start: 2025-08-08

## 2025-09-03 DIAGNOSIS — G25.2 ACTION TREMOR: ICD-10-CM

## 2025-09-04 RX ORDER — TOPIRAMATE 25 MG/1
TABLET, FILM COATED ORAL
Qty: 168 TABLET | Refills: 2 | Status: SHIPPED | OUTPATIENT
Start: 2025-09-04